# Patient Record
Sex: FEMALE | Race: WHITE | Employment: UNEMPLOYED
[De-identification: names, ages, dates, MRNs, and addresses within clinical notes are randomized per-mention and may not be internally consistent; named-entity substitution may affect disease eponyms.]

---

## 2023-02-02 ENCOUNTER — OFFICE VISIT (OUTPATIENT)
Dept: INTERNAL MEDICINE CLINIC | Facility: CLINIC | Age: 43
End: 2023-02-02
Payer: COMMERCIAL

## 2023-02-02 VITALS
HEIGHT: 64 IN | HEART RATE: 83 BPM | DIASTOLIC BLOOD PRESSURE: 76 MMHG | BODY MASS INDEX: 30.63 KG/M2 | SYSTOLIC BLOOD PRESSURE: 103 MMHG | WEIGHT: 179.4 LBS | OXYGEN SATURATION: 98 % | TEMPERATURE: 98.2 F

## 2023-02-02 DIAGNOSIS — E66.9 OBESITY (BMI 30-39.9): ICD-10-CM

## 2023-02-02 DIAGNOSIS — G43.909 MIGRAINE WITHOUT STATUS MIGRAINOSUS, NOT INTRACTABLE, UNSPECIFIED MIGRAINE TYPE: Primary | ICD-10-CM

## 2023-02-02 DIAGNOSIS — M06.341: ICD-10-CM

## 2023-02-02 DIAGNOSIS — F32.A DEPRESSION, UNSPECIFIED DEPRESSION TYPE: ICD-10-CM

## 2023-02-02 DIAGNOSIS — Z83.79 FAMILY HISTORY OF CIRRHOSIS OF LIVER: ICD-10-CM

## 2023-02-02 DIAGNOSIS — G40.A09: ICD-10-CM

## 2023-02-02 PROBLEM — M06.342: Status: ACTIVE | Noted: 2023-02-02

## 2023-02-02 PROCEDURE — 99204 OFFICE O/P NEW MOD 45 MIN: CPT | Performed by: INTERNAL MEDICINE

## 2023-02-02 RX ORDER — TOPIRAMATE 50 MG/1
TABLET, FILM COATED ORAL
Qty: 210 TABLET | Refills: 1 | Status: SHIPPED | OUTPATIENT
Start: 2023-02-02

## 2023-02-02 RX ORDER — TOPIRAMATE 50 MG/1
50 TABLET, FILM COATED ORAL 2 TIMES DAILY
COMMUNITY
End: 2023-02-02 | Stop reason: SDUPTHER

## 2023-02-02 RX ORDER — RIZATRIPTAN BENZOATE 10 MG/1
10 TABLET ORAL
Qty: 18 TABLET | Refills: 5 | Status: SHIPPED | OUTPATIENT
Start: 2023-02-02 | End: 2023-02-02

## 2023-02-02 RX ORDER — PHENTERMINE HYDROCHLORIDE 37.5 MG/1
37.5 CAPSULE ORAL EVERY MORNING
Qty: 30 CAPSULE | Refills: 1 | Status: SHIPPED | OUTPATIENT
Start: 2023-02-02 | End: 2023-04-03

## 2023-02-02 RX ORDER — RIZATRIPTAN BENZOATE 10 MG/1
10 TABLET ORAL
COMMUNITY
End: 2023-02-02 | Stop reason: SDUPTHER

## 2023-02-02 RX ORDER — FLUOXETINE HYDROCHLORIDE 40 MG/1
40 CAPSULE ORAL DAILY
Qty: 90 CAPSULE | Refills: 1 | Status: SHIPPED | OUTPATIENT
Start: 2023-02-02

## 2023-02-02 RX ORDER — FLUOXETINE HYDROCHLORIDE 20 MG/1
20 CAPSULE ORAL DAILY
COMMUNITY
End: 2023-02-02 | Stop reason: SDUPTHER

## 2023-02-02 ASSESSMENT — PATIENT HEALTH QUESTIONNAIRE - PHQ9
SUM OF ALL RESPONSES TO PHQ QUESTIONS 1-9: 0
SUM OF ALL RESPONSES TO PHQ QUESTIONS 1-9: 0
SUM OF ALL RESPONSES TO PHQ9 QUESTIONS 1 & 2: 0
SUM OF ALL RESPONSES TO PHQ QUESTIONS 1-9: 0
SUM OF ALL RESPONSES TO PHQ QUESTIONS 1-9: 0
2. FEELING DOWN, DEPRESSED OR HOPELESS: 0
1. LITTLE INTEREST OR PLEASURE IN DOING THINGS: 0

## 2023-02-02 NOTE — ACP (ADVANCE CARE PLANNING)
Advance Care Planning   Advance Care Planning (ACP)     Attempted to discuss ACP with Ms. Andino today, she declines to discuss at this time. Will readdress at future visit.

## 2023-02-02 NOTE — ASSESSMENT & PLAN NOTE
Patient continues with very active lifestyle, but with concerns of slowing metabolism. Interested in trial of Adipex at this time.

## 2023-02-02 NOTE — PROGRESS NOTES
Iqra Cheung (: 1980) is a 40 y.o. female, here for evaluation of the following chief complaint(s):  New Patient (Pt is here to estab PCP care. Pt has moved from [facility] with Dr. Gisela Almendarez) and Weight Management (Pt would like to discuss some weight loss options. Pt states she's never lost weight after she gave birth)       ASSESSMENT/PLAN:  1. Migraine without status migrainosus, not intractable, unspecified migraine type  Assessment & Plan:  Patient establishing with prescriptions needing refills for maxalt and topamax. Offering trial of Ubrelvy. Orders:  -     rizatriptan (MAXALT) 10 MG tablet; Take 1 tablet by mouth once as needed for Migraine May repeat in 2 hours if needed, Disp-18 tablet, R-5Normal  -     topiramate (TOPAMAX) 50 MG tablet; 3 tablets by mouth in the morning, take 4 tablets nightly, Disp-210 tablet, R-1Normal  -     FLUoxetine (PROZAC) 40 MG capsule; Take 1 capsule by mouth daily 2 capsules by mouth every day, Disp-90 capsule, R-1Normal  -     [address]  2. Depression, unspecified depression type  Assessment & Plan:  Patient establishing with prescription for Prozac 20 Will refill. Orders:  -     FLUoxetine (PROZAC) 40 MG capsule; Take 1 capsule by mouth daily 2 capsules by mouth every day, Disp-90 capsule, R-1Normal  3. Childhood absence epilepsy, non-refractory (HCC)  Assessment & Plan:   Normal EEG's. Absence seizures as a child. One grand mal. Offering referral to Neurology. 4. Obesity (BMI 30-39. 9)  Assessment & Plan:  Patient continues with very active lifestyle, but with concerns of slowing metabolism. Interested in trial of Adipex at this time. Orders:  -     Hemoglobin A1C; Future  -     phentermine (ADIPEX-P) 37.5 MG capsule; Take 1 capsule by mouth every morning for 60 days. Max Daily Amount: 37.5 mg, Disp-30 capsule, R-1Normal  5. Rheumatoid nodule of right hand [facility])  Assessment & Plan:   Checking ARTUR, RF, and anti-ccp.  Patient mother and grandmother with erosive arthritis. Orders:  -     ARTUR, Direct, w/Reflex; Future  -     Rheumatoid Factor; Future  -     CCP Antibodies, IGG/IGA; Future  6. Family history of cirrhosis of liver  Assessment & Plan:   Father with non-alcoholic liver cirrhosis. Checking CMP. Encouraging weight loss. Orders:  -     Comprehensive Metabolic Panel; Future             SUBJECTIVE/OBJECTIVE:  HPI: Patient is a very pleasant 73-year-old woman with medical history significant for migraine disorder, childhood epilepsy, posttraumatic stress anxiety, rheumatoid nodule on the left PIP, family history of liver cirrhosis secondary to fatty liver the presents to establish care with new PCP. Patient's weight is in the obese category at 179 with a BMI of 30.79. Weight increase since birth of her last child has been challening to get off. Patient has been using Maxalt for migraine control but with limited success. Patient has been using Topamax has seizure prophylaxis given her remote childhood history of seizures. Patient with a rheumatoid nodule removed from her left hand roughly 1 year ago. Patient has concerns of challenging weight despite her 4 times weekly exercise routine. We will check established with labs at this time. We will offer patient ARTUR as well as rheumatoid factor and CCP. Given patient's family history of liver disease, will consider smooth muscle antibody as well following results of ARTUR and metabolic panel. Patient [address] eligible for a routine screening vaccination methods. Offered patient trial of Ubrelvy at this time for uncontrolled migraine. Bilateral knee pain present that improves with use at the time of her right third digit nodule. Previous elevations in lupus labs. Exercising with walking and chasing her 3year-old Ramsey around.    Social History     Tobacco Use    Smoking status: Never    Smokeless tobacco: Never   Vaping Use    Vaping Use: Never used   Substance Use Topics    Drug use: Never     Vitals:    02/02/23 1049   BP: 103/76   Pulse: 83   Temp: 98.2 °F (36.8 °C)   SpO2: 98%   Weight: 179 lb 6.4 oz (81.4 kg)   Height: 5' 4\" (1.626 m)      Body mass index is 30.79 kg/m². Physical Exam  Constitutional:       Appearance: Normal appearance. HENT:      Head: Normocephalic. Eyes:      Extraocular Movements: Extraocular movements intact. Cardiovascular:      Rate and Rhythm: Normal rate and regular rhythm. Skin:     General: Skin is warm and dry. Neurological:      Mental Status: She is alert. An electronic signature was used to authenticate this note.   Srikanth Garcia, DO

## 2023-02-02 NOTE — ASSESSMENT & PLAN NOTE
Patient establishing with prescriptions needing refills for maxalt and topamax. Offering trial of Ubrelvy.

## 2023-02-03 DIAGNOSIS — M06.341: ICD-10-CM

## 2023-02-03 DIAGNOSIS — E66.9 OBESITY (BMI 30-39.9): ICD-10-CM

## 2023-02-03 DIAGNOSIS — R76.8 DS DNA ANTIBODY POSITIVE: ICD-10-CM

## 2023-02-03 DIAGNOSIS — R76.8 ANA POSITIVE: Primary | ICD-10-CM

## 2023-02-03 DIAGNOSIS — Z83.79 FAMILY HISTORY OF CIRRHOSIS OF LIVER: ICD-10-CM

## 2023-02-03 LAB
EST. AVERAGE GLUCOSE BLD GHB EST-MCNC: 111 MG/DL
HBA1C MFR BLD: 5.5 % (ref 4.8–5.6)

## 2023-02-04 LAB
ALBUMIN SERPL-MCNC: 3.7 G/DL (ref 3.5–5)
ALBUMIN/GLOB SERPL: 1 (ref 0.4–1.6)
ALP SERPL-CCNC: 103 U/L (ref 50–136)
ALT SERPL-CCNC: 21 U/L (ref 12–65)
ANION GAP SERPL CALC-SCNC: 7 MMOL/L (ref 2–11)
AST SERPL-CCNC: 8 U/L (ref 15–37)
BILIRUB SERPL-MCNC: 0.4 MG/DL (ref 0.2–1.1)
BUN SERPL-MCNC: 19 MG/DL (ref 6–23)
CALCIUM SERPL-MCNC: 9.1 MG/DL (ref 8.3–10.4)
CHLORIDE SERPL-SCNC: 115 MMOL/L (ref 101–110)
CO2 SERPL-SCNC: 23 MMOL/L (ref 21–32)
CREAT SERPL-MCNC: 1 MG/DL (ref 0.6–1)
GLOBULIN SER CALC-MCNC: 3.6 G/DL (ref 2.8–4.5)
GLUCOSE SERPL-MCNC: 117 MG/DL (ref 65–100)
POTASSIUM SERPL-SCNC: 4.2 MMOL/L (ref 3.5–5.1)
PROT SERPL-MCNC: 7.3 G/DL (ref 6.3–8.2)
SODIUM SERPL-SCNC: 145 MMOL/L (ref 133–143)

## 2023-02-06 LAB
ANA SER QL: POSITIVE
CENTROMERE B AB SER-ACNC: <0.2 AI (ref 0–0.9)
CHROMATIN AB SERPL-ACNC: <0.2 AI (ref 0–0.9)
DSDNA AB SER-ACNC: 20 IU/ML (ref 0–9)
ENA JO1 AB SER-ACNC: <0.2 AI (ref 0–0.9)
ENA RNP AB SER-ACNC: <0.2 AI (ref 0–0.9)
ENA SCL70 AB SER-ACNC: <0.2 AI (ref 0–0.9)
ENA SM AB SER-ACNC: <0.2 AI (ref 0–0.9)
ENA SS-A AB SER-ACNC: <0.2 AI (ref 0–0.9)
ENA SS-B AB SER-ACNC: <0.2 AI (ref 0–0.9)
Lab: ABNORMAL
RHEUMATOID FACT SER QL LA: NEGATIVE

## 2023-02-07 LAB — CCP IGA+IGG SERPL IA-ACNC: 4 UNITS (ref 0–19)

## 2023-02-08 PROBLEM — R76.8 DS DNA ANTIBODY POSITIVE: Status: ACTIVE | Noted: 2023-02-08

## 2023-02-08 PROBLEM — R76.8 ANA POSITIVE: Status: ACTIVE | Noted: 2023-02-08

## 2023-03-13 ENCOUNTER — OFFICE VISIT (OUTPATIENT)
Dept: INTERNAL MEDICINE CLINIC | Facility: CLINIC | Age: 43
End: 2023-03-13
Payer: COMMERCIAL

## 2023-03-13 VITALS
HEART RATE: 111 BPM | WEIGHT: 172.8 LBS | BODY MASS INDEX: 29.5 KG/M2 | DIASTOLIC BLOOD PRESSURE: 80 MMHG | HEIGHT: 64 IN | OXYGEN SATURATION: 100 % | SYSTOLIC BLOOD PRESSURE: 149 MMHG | TEMPERATURE: 98.6 F

## 2023-03-13 DIAGNOSIS — R76.8 ANA POSITIVE: ICD-10-CM

## 2023-03-13 DIAGNOSIS — R76.8 DS DNA ANTIBODY POSITIVE: ICD-10-CM

## 2023-03-13 DIAGNOSIS — R03.0 ELEVATED BLOOD PRESSURE READING IN OFFICE WITHOUT DIAGNOSIS OF HYPERTENSION: ICD-10-CM

## 2023-03-13 DIAGNOSIS — G43.909 MIGRAINE WITHOUT STATUS MIGRAINOSUS, NOT INTRACTABLE, UNSPECIFIED MIGRAINE TYPE: Primary | ICD-10-CM

## 2023-03-13 DIAGNOSIS — E66.9 OBESITY (BMI 30-39.9): ICD-10-CM

## 2023-03-13 PROCEDURE — 99214 OFFICE O/P EST MOD 30 MIN: CPT | Performed by: INTERNAL MEDICINE

## 2023-03-13 SDOH — ECONOMIC STABILITY: INCOME INSECURITY: HOW HARD IS IT FOR YOU TO PAY FOR THE VERY BASICS LIKE FOOD, HOUSING, MEDICAL CARE, AND HEATING?: NOT HARD AT ALL

## 2023-03-13 SDOH — ECONOMIC STABILITY: FOOD INSECURITY: WITHIN THE PAST 12 MONTHS, YOU WORRIED THAT YOUR FOOD WOULD RUN OUT BEFORE YOU GOT MONEY TO BUY MORE.: NEVER TRUE

## 2023-03-13 SDOH — ECONOMIC STABILITY: HOUSING INSECURITY
IN THE LAST 12 MONTHS, WAS THERE A TIME WHEN YOU DID NOT HAVE A STEADY PLACE TO SLEEP OR SLEPT IN A SHELTER (INCLUDING NOW)?: NO

## 2023-03-13 SDOH — ECONOMIC STABILITY: FOOD INSECURITY: WITHIN THE PAST 12 MONTHS, THE FOOD YOU BOUGHT JUST DIDN'T LAST AND YOU DIDN'T HAVE MONEY TO GET MORE.: NEVER TRUE

## 2023-03-13 ASSESSMENT — PATIENT HEALTH QUESTIONNAIRE - PHQ9
4. FEELING TIRED OR HAVING LITTLE ENERGY: 0
2. FEELING DOWN, DEPRESSED OR HOPELESS: 0
3. TROUBLE FALLING OR STAYING ASLEEP: 0
SUM OF ALL RESPONSES TO PHQ9 QUESTIONS 1 & 2: 0
SUM OF ALL RESPONSES TO PHQ QUESTIONS 1-9: 0
9. THOUGHTS THAT YOU WOULD BE BETTER OFF DEAD, OR OF HURTING YOURSELF: 0
SUM OF ALL RESPONSES TO PHQ QUESTIONS 1-9: 0
7. TROUBLE CONCENTRATING ON THINGS, SUCH AS READING THE NEWSPAPER OR WATCHING TELEVISION: 0
SUM OF ALL RESPONSES TO PHQ QUESTIONS 1-9: 0
SUM OF ALL RESPONSES TO PHQ QUESTIONS 1-9: 0
8. MOVING OR SPEAKING SO SLOWLY THAT OTHER PEOPLE COULD HAVE NOTICED. OR THE OPPOSITE, BEING SO FIGETY OR RESTLESS THAT YOU HAVE BEEN MOVING AROUND A LOT MORE THAN USUAL: 0
10. IF YOU CHECKED OFF ANY PROBLEMS, HOW DIFFICULT HAVE THESE PROBLEMS MADE IT FOR YOU TO DO YOUR WORK, TAKE CARE OF THINGS AT HOME, OR GET ALONG WITH OTHER PEOPLE: 0
5. POOR APPETITE OR OVEREATING: 0
1. LITTLE INTEREST OR PLEASURE IN DOING THINGS: 0
6. FEELING BAD ABOUT YOURSELF - OR THAT YOU ARE A FAILURE OR HAVE LET YOURSELF OR YOUR FAMILY DOWN: 0

## 2023-03-13 NOTE — ASSESSMENT & PLAN NOTE
Patient utilizing Adipex for weight loss. Elevated BP to 149/80 in office. Will consider low dose ARB or BB at future visit if continued elevations.

## 2023-03-13 NOTE — ASSESSMENT & PLAN NOTE
Trial of Adipex offered at last visit. Weight at that time 179. Down to 172 today in office. BP slightly elevated today.

## 2023-03-13 NOTE — ACP (ADVANCE CARE PLANNING)
Advance Care Planning   The patient has the following advanced directives on file:  Advance Directives       Power of [address] ACP-Advance Directive ACP-Power of     Not on File Not on File Not on File Not on File            The patient has appointed the following active healthcare agents:    Primary Decision Maker: Aidegil Anita - Spouse - [phone]    The Patient has the following current code status:    Code Status: Not on file    Visit Documentation:  I discussed [address] with Aliajodi Gilma today which included the importance of making their choices for care and treatment in the case of a health event that adversely affects their decision-making abilities. She has not completed the Advance Care Directives. She does not have an active health care agent at this time. Shimon Dillard was encouraged to complete the declaration forms and provide a signed copy of her medical records.      Bertin Ryan  3/13/2023

## 2023-03-13 NOTE — PROGRESS NOTES
Azucena Crockett (: 1980) is a 40 y.o. female, here for evaluation of the following chief complaint(s):  Discuss Labs (Pt is here to discuss lab results.)       ASSESSMENT/PLAN:  1. Migraine without status migrainosus, not intractable, unspecified migraine type  Assessment & Plan:   Using Maxalt and Topamax. Hx of childhood absence seizure. Offering Nolia Closs as a trial.   2. Obesity (BMI 30-39. 9)  Assessment & Plan:   Trial of Adipex offered at last visit. Weight at that time 179. Down to 172 today in office. BP slightly elevated today. 3. Ds DNA antibody positive  Assessment & Plan:  Referred previously to Rheumatology. No scheduled appt currently listed. 4. ARTUR positive  5. Elevated blood pressure reading in office without diagnosis of hypertension  Assessment & Plan:   Patient utilizing Adipex for weight loss. Elevated BP to 149/80 in office. Will consider low dose ARB or BB at future visit if continued elevations. SUBJECTIVE/OBJECTIVE:  HPI: Patient is a very pleasant 27-year-old woman with a medical history significant for migraine disorder, obesity, rheumatoid nodule of the right hand, childhood absence epilepsy, and positive ARTUR, and double-stranded DNA antibody presents for routine follow-up visit. Patient previously referred to rheumatology. Patient with strong rheumatologic family history. Patient's goal for weight loss assisted with use of Adipex though increasing blood pressure. Patient's systolic blood pressure elevated above 140 today in office. We will consider additional treatment options including angiotensin receptor blockade or BB given her elevated HR at future visits if continued elevations present. Patient would like to discontinue Adipex at this time as an adverse effect on her depression currently occurring. Current concerns of right arm parasthesia. Previously referred to neurology. Sensation described as a tingling sensation intermittently.  No alleviated or aggravating factors. No change in quality or consistency of symptoms. Her migraine use of topamax has been onging for 20 years. She has concerns for potential memory impairment. Migraine burden related to ovulatory cycle occurring frequently over the course of a week. Maxalt is not breaking migraine. Previous efforts to consider [employer] as an alternative has not resulted in improved symptoms. Social History     Tobacco Use    Smoking status: Never    Smokeless tobacco: Never   Vaping Use    Vaping Use: Never used   Substance Use Topics    Drug use: Never     Vitals:    03/13/23 1141   BP: (!) 149/80   Pulse: (!) 111   Temp: 98.6 °F (37 °C)   SpO2: 100%   Weight: 172 lb 12.8 oz (78.4 kg)   Height: 5' 4\" (1.626 m)      Body mass index is 29.66 kg/m². Physical Exam  Constitutional:       Appearance: Normal appearance. HENT:      Head: Normocephalic. Eyes:      Extraocular Movements: Extraocular movements intact. Cardiovascular:      Rate and Rhythm: Normal rate and regular rhythm. Skin:     General: Skin is warm and dry. Neurological:      Mental Status: She is alert. [place]-to-face interaction with patient 30 minutes. An electronic signature was used to authenticate this note.   Jill Kendrick DO

## 2023-04-17 ENCOUNTER — OFFICE VISIT (OUTPATIENT)
Dept: INTERNAL MEDICINE CLINIC | Facility: CLINIC | Age: 43
End: 2023-04-17
Payer: COMMERCIAL

## 2023-04-17 VITALS
DIASTOLIC BLOOD PRESSURE: 76 MMHG | BODY MASS INDEX: 29.53 KG/M2 | SYSTOLIC BLOOD PRESSURE: 123 MMHG | OXYGEN SATURATION: 99 % | HEIGHT: 64 IN | WEIGHT: 173 LBS | TEMPERATURE: 98.3 F | HEART RATE: 93 BPM

## 2023-04-17 DIAGNOSIS — R21 RASH AND NONSPECIFIC SKIN ERUPTION: Primary | ICD-10-CM

## 2023-04-17 DIAGNOSIS — L28.2 PRURIGO: ICD-10-CM

## 2023-04-17 PROCEDURE — 99213 OFFICE O/P EST LOW 20 MIN: CPT | Performed by: NURSE PRACTITIONER

## 2023-04-17 RX ORDER — TRIAMCINOLONE ACETONIDE 5 MG/G
CREAM TOPICAL
Qty: 45 G | Refills: 1 | Status: SHIPPED | OUTPATIENT
Start: 2023-04-17

## 2023-04-17 ASSESSMENT — ENCOUNTER SYMPTOMS
EYE PAIN: 0
SORE THROAT: 0
DIARRHEA: 0
ABDOMINAL PAIN: 0
SHORTNESS OF BREATH: 0
SINUS PAIN: 0
COUGH: 0
RHINORRHEA: 0
NAUSEA: 0
BACK PAIN: 0
VOMITING: 0
CONSTIPATION: 0

## 2023-04-17 NOTE — PROGRESS NOTES
[place] [id number] [facility]  Tel# [phone]  Fax# [phone]     Tory Paulino [place], [facility]-BC  Family Nurse Practitioner            Date of Visit: 2023     Casi Jaime (: 1980) is a 40 y.o. female  established patient, here for evaluation of the following chief complaint(s):    Chief Complaint   Patient presents with    Vaginal Itching     For the past 2 mths she has noticed the texture of her skin has changed outside of her vagina (bump/scaly), next to her bottom. She states wiping aggravates it and her skin breaks easily. She states she has itching, but it is outside of her vagina. She does not have a GYN here yet, and cannot get in until  or July. Patient Care Team:  Renetta Lyn DO as PCP - General (Internal Medicine)  Renetta Lyn DO as PCP - Empaneled Provider         History of Present Illness      Itching  Presents here today with itching to her perineal area that started 2 months ago. Pt states she can feel her skin texture has changed and feels irritated when wiping with tissue. Has tried OTC vaseline cream. Denies any vaginal discharge or skin discharge. States she had an abscess to right labia area last year that needed to be excised. Pt denies any hx of STD such as HSV. Patient Active Problem List   Diagnosis    Migraine without status migrainosus, not intractable    Depression    Childhood absence epilepsy, non-refractory (HCC)    Obesity (BMI 30-39. 9)    Rheumatoid nodule of right hand (HCC)    Family history of cirrhosis of liver    Ds DNA antibody positive    ARTUR positive    Elevated blood pressure reading in office without diagnosis of hypertension       Past Medical History:   Diagnosis Date    Headache        History reviewed. No pertinent surgical history. Family History   Problem Relation Age of Onset    Liver Disease Father         end stage liver disease.     No Known Problems Brother          ALLERGY  Allergies

## 2023-08-02 DIAGNOSIS — G43.909 MIGRAINE WITHOUT STATUS MIGRAINOSUS, NOT INTRACTABLE, UNSPECIFIED MIGRAINE TYPE: ICD-10-CM

## 2023-08-02 RX ORDER — TOPIRAMATE 50 MG/1
TABLET, FILM COATED ORAL
Qty: 210 TABLET | Refills: 1 | Status: SHIPPED | OUTPATIENT
Start: 2023-08-02

## 2023-08-02 NOTE — TELEPHONE ENCOUNTER
Refill of Topamax  50 mg requested  - pended.   Patient last seen 3-13-23  Next office visit 9-13-23

## 2023-08-22 ENCOUNTER — OFFICE VISIT (OUTPATIENT)
Dept: INTERNAL MEDICINE CLINIC | Facility: CLINIC | Age: 43
End: 2023-08-22
Payer: COMMERCIAL

## 2023-08-22 VITALS
SYSTOLIC BLOOD PRESSURE: 100 MMHG | HEART RATE: 82 BPM | TEMPERATURE: 98.6 F | HEIGHT: 64 IN | DIASTOLIC BLOOD PRESSURE: 75 MMHG | OXYGEN SATURATION: 98 % | BODY MASS INDEX: 30.46 KG/M2 | WEIGHT: 178.4 LBS

## 2023-08-22 DIAGNOSIS — F32.A DEPRESSION, UNSPECIFIED DEPRESSION TYPE: ICD-10-CM

## 2023-08-22 DIAGNOSIS — R76.8 DS DNA ANTIBODY POSITIVE: ICD-10-CM

## 2023-08-22 DIAGNOSIS — R03.0 ELEVATED BLOOD PRESSURE READING IN OFFICE WITHOUT DIAGNOSIS OF HYPERTENSION: Primary | ICD-10-CM

## 2023-08-22 DIAGNOSIS — M99.02 SOMATIC DYSFUNCTION OF THORACIC REGION: ICD-10-CM

## 2023-08-22 DIAGNOSIS — R76.8 ANA POSITIVE: ICD-10-CM

## 2023-08-22 DIAGNOSIS — R21 RASH AND NONSPECIFIC SKIN ERUPTION: ICD-10-CM

## 2023-08-22 DIAGNOSIS — K12.0 APHTHAE, ORAL: ICD-10-CM

## 2023-08-22 DIAGNOSIS — G43.909 MIGRAINE WITHOUT STATUS MIGRAINOSUS, NOT INTRACTABLE, UNSPECIFIED MIGRAINE TYPE: ICD-10-CM

## 2023-08-22 DIAGNOSIS — G40.A09: ICD-10-CM

## 2023-08-22 DIAGNOSIS — M06.341: ICD-10-CM

## 2023-08-22 PROCEDURE — 98926 OSTEOPATH MANJ 3-4 REGIONS: CPT | Performed by: INTERNAL MEDICINE

## 2023-08-22 PROCEDURE — 99214 OFFICE O/P EST MOD 30 MIN: CPT | Performed by: INTERNAL MEDICINE

## 2023-08-22 RX ORDER — FLUOXETINE HYDROCHLORIDE 40 MG/1
40 CAPSULE ORAL DAILY
Qty: 90 CAPSULE | Refills: 1 | Status: SHIPPED | OUTPATIENT
Start: 2023-08-22

## 2023-08-22 RX ORDER — TOPIRAMATE 50 MG/1
TABLET, FILM COATED ORAL
Qty: 210 TABLET | Refills: 1 | Status: SHIPPED | OUTPATIENT
Start: 2023-08-22

## 2023-08-22 RX ORDER — TRIAMCINOLONE ACETONIDE 5 MG/G
CREAM TOPICAL
Qty: 45 G | Refills: 1 | Status: SHIPPED | OUTPATIENT
Start: 2023-08-22

## 2023-08-22 RX ORDER — RIZATRIPTAN BENZOATE 10 MG/1
10 TABLET ORAL
Qty: 18 TABLET | Refills: 5 | Status: SHIPPED | OUTPATIENT
Start: 2023-08-22 | End: 2023-08-22

## 2023-08-22 RX ORDER — PREDNISONE 20 MG/1
20 TABLET ORAL DAILY
Qty: 5 TABLET | Refills: 0 | Status: SHIPPED | OUTPATIENT
Start: 2023-08-22 | End: 2023-08-27

## 2023-08-22 ASSESSMENT — PATIENT HEALTH QUESTIONNAIRE - PHQ9
SUM OF ALL RESPONSES TO PHQ9 QUESTIONS 1 & 2: 0
7. TROUBLE CONCENTRATING ON THINGS, SUCH AS READING THE NEWSPAPER OR WATCHING TELEVISION: 0
SUM OF ALL RESPONSES TO PHQ QUESTIONS 1-9: 0
3. TROUBLE FALLING OR STAYING ASLEEP: 0
2. FEELING DOWN, DEPRESSED OR HOPELESS: 0
4. FEELING TIRED OR HAVING LITTLE ENERGY: 0
9. THOUGHTS THAT YOU WOULD BE BETTER OFF DEAD, OR OF HURTING YOURSELF: 0
SUM OF ALL RESPONSES TO PHQ QUESTIONS 1-9: 0
6. FEELING BAD ABOUT YOURSELF - OR THAT YOU ARE A FAILURE OR HAVE LET YOURSELF OR YOUR FAMILY DOWN: 0
1. LITTLE INTEREST OR PLEASURE IN DOING THINGS: 0
5. POOR APPETITE OR OVEREATING: 0
8. MOVING OR SPEAKING SO SLOWLY THAT OTHER PEOPLE COULD HAVE NOTICED. OR THE OPPOSITE, BEING SO FIGETY OR RESTLESS THAT YOU HAVE BEEN MOVING AROUND A LOT MORE THAN USUAL: 0
10. IF YOU CHECKED OFF ANY PROBLEMS, HOW DIFFICULT HAVE THESE PROBLEMS MADE IT FOR YOU TO DO YOUR WORK, TAKE CARE OF THINGS AT HOME, OR GET ALONG WITH OTHER PEOPLE: 0

## 2023-08-22 NOTE — ASSESSMENT & PLAN NOTE
Referred to Rheum previously. Patient missed visit as her father was undergoing liver transplant at the time. Continued right rheumatoid nodule of right third digit. New oral aphthous ulcers. Patient with new point specific puritis and tenderness at the perineum. 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 Offering steroid burst and referral back to Rheum. Discussed potential plaquenil in the future.

## 2023-08-22 NOTE — ACP (ADVANCE CARE PLANNING)
Advance Care Planning   The patient has the following advanced directives on file:  Advance Directives       Power of [address] Will ACP-Advance Directive ACP-Power of     Not on File Not on File Not on File Not on File            The patient has appointed the following active healthcare agents:    Primary Decision Maker: Sri Payne - Spouse - [phone]    The Patient has the following current code status:    Code Status: Not on file    Visit Documentation:  I discussed [address] [place] [address] with aRchel Wilson today which included the importance of making their choices for care and treatment in the case of a health event that adversely affects their decision-making abilities. She has not completed the Advance Care Directives. She does not have an active health care agent at this time. Rachel Wilson was encouraged to complete the declaration forms and provide a signed copy of her medical records.          Bertin Hall  8/22/2023
202.8

## 2023-08-22 NOTE — ASSESSMENT & PLAN NOTE
Tolerant of Maxalt and Topamax prophylactic and with hx of absance seizure  Long term use of Topamax concerning to patient as her memory has been spotty lately.

## 2023-08-22 NOTE — PROGRESS NOTES
Micki Orr (: 1980) is a 40 y.o. female, here for evaluation of the following chief complaint(s):  6 Month Follow-Up (Pt is here for routine check up.)       ASSESSMENT/PLAN:  1. Elevated blood pressure reading in office without diagnosis of hypertension  Assessment & Plan:   Well controlled BP in office today. 2. Migraine without status migrainosus, not intractable, unspecified migraine type  Assessment & Plan: Tolerant of Maxalt and Topamax prophylactic and with hx of absance seizure  Long term use of Topamax concerning to patient as her memory has been spotty lately. Orders:  -     topiramate (TOPAMAX) 50 MG tablet; 3 tablets by mouth in the morning, take 4 tablets nightly, Disp-210 tablet, R-1Normal  -     rizatriptan (MAXALT) 10 MG tablet; Take 1 tablet by mouth once as needed for Migraine May repeat in 2 hours if needed, Disp-18 tablet, R-5Normal  -     FLUoxetine (PROZAC) 40 MG capsule; Take 1 capsule by mouth daily 2 capsules by mouth every day, Disp-90 capsule, R-1Normal  -     [facility]  3. Rash and nonspecific skin eruption  -     triamcinolone (ARISTOCORT) 0.5 % cream; Apply topically 2 times daily. , Disp-45 g, R-1, Normal  4. Depression, unspecified depression type  -     FLUoxetine (PROZAC) 40 MG capsule; Take 1 capsule by mouth daily 2 capsules by mouth every day, Disp-90 capsule, R-1Normal  5. Ds DNA antibody positive  Assessment & Plan:  Referred to Rheum previously. Patient missed visit as her father was undergoing liver transplant at the time. Continued right rheumatoid nodule of right third digit. New oral aphthous ulcers. Patient with new point specific puritis and tenderness at the perineum. 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 Offering steroid burst and referral back to Rheum. Discussed potential plaquenil in the future. Orders:  -     predniSONE (DELTASONE) 20 MG tablet;  Take 1 tablet by mouth daily for 5 days, Disp-5

## 2023-08-30 ENCOUNTER — TELEPHONE (OUTPATIENT)
Dept: INTERNAL MEDICINE CLINIC | Facility: CLINIC | Age: 43
End: 2023-08-30

## 2023-08-30 DIAGNOSIS — G43.909 MIGRAINE WITHOUT STATUS MIGRAINOSUS, NOT INTRACTABLE, UNSPECIFIED MIGRAINE TYPE: ICD-10-CM

## 2023-08-30 DIAGNOSIS — F32.A DEPRESSION, UNSPECIFIED DEPRESSION TYPE: ICD-10-CM

## 2023-08-30 RX ORDER — FLUOXETINE HYDROCHLORIDE 40 MG/1
CAPSULE ORAL
Qty: 180 CAPSULE | Refills: 1 | Status: SHIPPED | OUTPATIENT
Start: 2023-08-30

## 2023-09-20 ENCOUNTER — PATIENT MESSAGE (OUTPATIENT)
Dept: INTERNAL MEDICINE CLINIC | Facility: CLINIC | Age: 43
End: 2023-09-20

## 2023-09-20 DIAGNOSIS — G43.909 MIGRAINE WITHOUT STATUS MIGRAINOSUS, NOT INTRACTABLE, UNSPECIFIED MIGRAINE TYPE: Primary | ICD-10-CM

## 2023-09-20 RX ORDER — PREDNISONE 20 MG/1
20 TABLET ORAL DAILY
Qty: 7 TABLET | Refills: 0 | Status: SHIPPED | OUTPATIENT
Start: 2023-09-20 | End: 2023-09-27

## 2023-10-05 ENCOUNTER — OFFICE VISIT (OUTPATIENT)
Dept: INTERNAL MEDICINE CLINIC | Facility: CLINIC | Age: 43
End: 2023-10-05
Payer: COMMERCIAL

## 2023-10-05 VITALS
HEART RATE: 53 BPM | BODY MASS INDEX: 30.39 KG/M2 | HEIGHT: 64 IN | WEIGHT: 178 LBS | OXYGEN SATURATION: 99 % | TEMPERATURE: 98.3 F | SYSTOLIC BLOOD PRESSURE: 104 MMHG | DIASTOLIC BLOOD PRESSURE: 65 MMHG

## 2023-10-05 DIAGNOSIS — R76.8 DS DNA ANTIBODY POSITIVE: Primary | ICD-10-CM

## 2023-10-05 DIAGNOSIS — M25.511 RIGHT SHOULDER PAIN, UNSPECIFIED CHRONICITY: ICD-10-CM

## 2023-10-05 DIAGNOSIS — R76.8 ANA POSITIVE: ICD-10-CM

## 2023-10-05 LAB
ERYTHROCYTE [DISTWIDTH] IN BLOOD BY AUTOMATED COUNT: 13.2 % (ref 11.9–14.6)
ERYTHROCYTE [SEDIMENTATION RATE] IN BLOOD: 3 MM/HR (ref 0–20)
HCT VFR BLD AUTO: 43.3 % (ref 35.8–46.3)
HGB BLD-MCNC: 13.9 G/DL (ref 11.7–15.4)
MCH RBC QN AUTO: 28.7 PG (ref 26.1–32.9)
MCHC RBC AUTO-ENTMCNC: 32.1 G/DL (ref 31.4–35)
MCV RBC AUTO: 89.5 FL (ref 82–102)
NRBC # BLD: 0 K/UL (ref 0–0.2)
PLATELET # BLD AUTO: 329 K/UL (ref 150–450)
PMV BLD AUTO: 10.1 FL (ref 9.4–12.3)
RBC # BLD AUTO: 4.84 M/UL (ref 4.05–5.2)
WBC # BLD AUTO: 8.1 K/UL (ref 4.3–11.1)

## 2023-10-05 PROCEDURE — 99214 OFFICE O/P EST MOD 30 MIN: CPT | Performed by: INTERNAL MEDICINE

## 2023-10-05 RX ORDER — GABAPENTIN 100 MG/1
100 CAPSULE ORAL 3 TIMES DAILY
Qty: 270 CAPSULE | Refills: 1 | Status: SHIPPED | OUTPATIENT
Start: 2023-10-05 | End: 2023-10-05

## 2023-10-05 RX ORDER — PREGABALIN 50 MG/1
50 CAPSULE ORAL 3 TIMES DAILY
Qty: 90 CAPSULE | Refills: 1 | Status: SHIPPED | OUTPATIENT
Start: 2023-10-05 | End: 2023-12-04

## 2023-10-05 ASSESSMENT — PATIENT HEALTH QUESTIONNAIRE - PHQ9
1. LITTLE INTEREST OR PLEASURE IN DOING THINGS: 0
7. TROUBLE CONCENTRATING ON THINGS, SUCH AS READING THE NEWSPAPER OR WATCHING TELEVISION: 0
6. FEELING BAD ABOUT YOURSELF - OR THAT YOU ARE A FAILURE OR HAVE LET YOURSELF OR YOUR FAMILY DOWN: 0
SUM OF ALL RESPONSES TO PHQ QUESTIONS 1-9: 0
3. TROUBLE FALLING OR STAYING ASLEEP: 0
10. IF YOU CHECKED OFF ANY PROBLEMS, HOW DIFFICULT HAVE THESE PROBLEMS MADE IT FOR YOU TO DO YOUR WORK, TAKE CARE OF THINGS AT HOME, OR GET ALONG WITH OTHER PEOPLE: 0
SUM OF ALL RESPONSES TO PHQ QUESTIONS 1-9: 0
2. FEELING DOWN, DEPRESSED OR HOPELESS: 0
4. FEELING TIRED OR HAVING LITTLE ENERGY: 0
SUM OF ALL RESPONSES TO PHQ9 QUESTIONS 1 & 2: 0
SUM OF ALL RESPONSES TO PHQ QUESTIONS 1-9: 0
5. POOR APPETITE OR OVEREATING: 0
8. MOVING OR SPEAKING SO SLOWLY THAT OTHER PEOPLE COULD HAVE NOTICED. OR THE OPPOSITE, BEING SO FIGETY OR RESTLESS THAT YOU HAVE BEEN MOVING AROUND A LOT MORE THAN USUAL: 0
SUM OF ALL RESPONSES TO PHQ QUESTIONS 1-9: 0
9. THOUGHTS THAT YOU WOULD BE BETTER OFF DEAD, OR OF HURTING YOURSELF: 0

## 2023-10-05 NOTE — PROGRESS NOTES
Deidre Ibarra (: 1980) is a 44 y.o. female, here for evaluation of the following chief complaint(s):  Chronic Pain (Having pain in her body worsening )       ASSESSMENT/PLAN:  1. Ds DNA antibody positive  Assessment & Plan:     Orders:  -     Anti- Artur Rebel; Future  -     Anti-Histone and Anti-DS DNA AB; Future  -     Sedimentation Rate; Future  -     C-Reactive Protein; Future  -     C3 Complement; Future  -     C4 Complement; Future  -     gabapentin (NEURONTIN) 100 MG capsule; Take 1 capsule by mouth 3 times daily for 180 days. Intended supply: 90 days, Disp-270 capsule, R-1Normal  -     Basic Metabolic Panel; Future  2. ARTUR positive  Assessment & Plan:   Patient with recent flare of diffuse pain and aphthous ulcerations lasting 12 days. Plaquenil 200 prescribed patient tolerating without significant issue though with some GI upset. Referred previously to rheumatology. Orders:  -     Anti- Artur Rebel; Future  -     Anti-Histone and Anti-DS DNA AB; Future  -     Sedimentation Rate; Future  -     C-Reactive Protein; Future  -     C3 Complement; Future  -     C4 Complement; Future  -     gabapentin (NEURONTIN) 100 MG capsule; Take 1 capsule by mouth 3 times daily for 180 days. Intended supply: 90 days, Disp-270 capsule, R-1Normal  -     Basic Metabolic Panel; Future  -     CBC; Future             SUBJECTIVE/OBJECTIVE:  HPI:Patient with a complex medical history presenting for follow up at office request given her recent concerns of pain. Patient with work up suggestive of rheumatologic disease. Referred previously to Rheumatology. Patient initiated on Plaquenil 200 through this office. Will continue. Checking lupus labs today to include anti-dsDNA, anti-smith, C3, C4, crp, esr, bmp.    Social History     Tobacco Use    Smoking status: Never    Smokeless tobacco: Never   Vaping Use    Vaping Use: Never used   Substance Use Topics    Alcohol use: Never    Drug use: Never     Vitals:    10/05/23 1530   BP: 104/65

## 2023-10-05 NOTE — ASSESSMENT & PLAN NOTE
Patient with recent flare of diffuse pain and aphthous ulcerations lasting 12 days. Plaquenil 200 prescribed patient tolerating without significant issue though with some GI upset. Referred previously to rheumatology.

## 2023-10-06 LAB
ANION GAP SERPL CALC-SCNC: 4 MMOL/L (ref 2–11)
BUN SERPL-MCNC: 18 MG/DL (ref 6–23)
CALCIUM SERPL-MCNC: 9.1 MG/DL (ref 8.3–10.4)
CHLORIDE SERPL-SCNC: 113 MMOL/L (ref 101–110)
CO2 SERPL-SCNC: 25 MMOL/L (ref 21–32)
CREAT SERPL-MCNC: 1.2 MG/DL (ref 0.6–1)
CRP SERPL-MCNC: 0.8 MG/DL (ref 0–0.9)
GLUCOSE SERPL-MCNC: 98 MG/DL (ref 65–100)
POTASSIUM SERPL-SCNC: 4 MMOL/L (ref 3.5–5.1)
SODIUM SERPL-SCNC: 142 MMOL/L (ref 133–143)

## 2023-10-07 LAB
C3 SERPL-MCNC: 144 MG/DL (ref 82–167)
C4 SERPL-MCNC: 36 MG/DL (ref 12–38)
DSDNA AB SER-ACNC: 21 IU/ML (ref 0–9)
ENA SM AB SER-ACNC: <0.2 AI (ref 0–0.9)

## 2023-10-09 ENCOUNTER — TELEPHONE (OUTPATIENT)
Dept: INTERNAL MEDICINE CLINIC | Facility: CLINIC | Age: 43
End: 2023-10-09

## 2023-10-09 NOTE — TELEPHONE ENCOUNTER
----- Message from Rody Ibarra DO sent at 10/9/2023  2:12 PM EDT -----  Recent labs are available. Basic metabolic panel with slight dip in renal function. Anti-Espinoza antibodies within acceptable range, C3 and C4 proteins within acceptable range, CRP, sed rate both within normal range, double-stranded DNA antibodies redemonstrated slightly higher than previously.

## 2023-10-10 ENCOUNTER — TELEPHONE (OUTPATIENT)
Dept: INTERNAL MEDICINE CLINIC | Facility: CLINIC | Age: 43
End: 2023-10-10

## 2023-10-10 LAB
DSDNA AB SER-ACNC: 21 IU/ML (ref 0–9)
HISTONE IGG SER IA-ACNC: 0.4 UNITS (ref 0–0.9)

## 2023-10-10 NOTE — TELEPHONE ENCOUNTER
----- Message from Sai Ireland DO sent at 10/10/2023  4:08 PM EDT -----  Recent labs are available. Double-stranded DNA antibodies are present again. Previously demonstrated 8 months ago. Continued elevation. Previous results of been relayed.

## 2023-10-31 ENCOUNTER — OFFICE VISIT (OUTPATIENT)
Dept: NEUROLOGY | Age: 43
End: 2023-10-31
Payer: COMMERCIAL

## 2023-10-31 VITALS
HEART RATE: 78 BPM | DIASTOLIC BLOOD PRESSURE: 80 MMHG | SYSTOLIC BLOOD PRESSURE: 114 MMHG | BODY MASS INDEX: 30.56 KG/M2 | WEIGHT: 179 LBS | OXYGEN SATURATION: 96 % | HEIGHT: 64 IN

## 2023-10-31 DIAGNOSIS — Z79.899 ENCOUNTER FOR MEDICATION MANAGEMENT: ICD-10-CM

## 2023-10-31 DIAGNOSIS — M32.19 SYSTEMIC LUPUS ERYTHEMATOSUS WITH OTHER ORGAN INVOLVEMENT, UNSPECIFIED SLE TYPE (HCC): ICD-10-CM

## 2023-10-31 DIAGNOSIS — G40.A09: ICD-10-CM

## 2023-10-31 DIAGNOSIS — G43.709 CHRONIC MIGRAINE WITHOUT AURA WITHOUT STATUS MIGRAINOSUS, NOT INTRACTABLE: Primary | ICD-10-CM

## 2023-10-31 PROBLEM — M32.9 SYSTEMIC LUPUS ERYTHEMATOSUS (HCC): Status: ACTIVE | Noted: 2023-10-31

## 2023-10-31 PROCEDURE — 99205 OFFICE O/P NEW HI 60 MIN: CPT | Performed by: PSYCHIATRY & NEUROLOGY

## 2023-10-31 RX ORDER — ATOGEPANT 60 MG/1
60 TABLET ORAL DAILY
Qty: 30 TABLET | Refills: 2 | Status: SHIPPED | OUTPATIENT
Start: 2023-10-31 | End: 2024-01-29

## 2023-10-31 RX ORDER — FLUOXETINE HYDROCHLORIDE 40 MG/1
CAPSULE ORAL
COMMUNITY

## 2023-10-31 ASSESSMENT — ENCOUNTER SYMPTOMS
ABDOMINAL PAIN: 0
EYE PAIN: 0
SORE THROAT: 0
SHORTNESS OF BREATH: 0
NAUSEA: 0
COUGH: 0
TROUBLE SWALLOWING: 0
DIARRHEA: 0

## 2023-10-31 ASSESSMENT — PATIENT HEALTH QUESTIONNAIRE - PHQ9
SUM OF ALL RESPONSES TO PHQ9 QUESTIONS 1 & 2: 0
1. LITTLE INTEREST OR PLEASURE IN DOING THINGS: 0
SUM OF ALL RESPONSES TO PHQ QUESTIONS 1-9: 0
SUM OF ALL RESPONSES TO PHQ QUESTIONS 1-9: 0
2. FEELING DOWN, DEPRESSED OR HOPELESS: 0
SUM OF ALL RESPONSES TO PHQ QUESTIONS 1-9: 0
SUM OF ALL RESPONSES TO PHQ QUESTIONS 1-9: 0

## 2023-10-31 NOTE — PROGRESS NOTES
[facility] NEUROLOGY NOTE    Patient: Tyler Oliveira  Physician: Jamison Rosa MD    CC:   Chief Complaint   Patient presents with    New Patient     Migraines and Childhood absence epilepsy     PCP: Brittney Sousa DO   Referring Provider: Brittney Sousa DO     History of Present Illness:     Tyler Oliveira is a 44 y.o. female who presents for evaluation of migraines and management of epilepsy. Patient reports having frequent chronic migraines occurring with about 16 headache days a month, often lasting up to 2 days in duration. Location begins in occipital region and sometimes becomes unilateral.  There is associated nausea and photophobia when headaches reach 8-10 out of 10 in severity. She has migraine triggers from irritating chemical smells such as bleach. When she tries to use rizatriptan, headache severity decreases from 10 out of 10 to 8 out of 10 and does not improve with the second dose. She has tried to max out the dose to 20 mg per 24 hours without relief. Clinically, this is considered failure to treatment with rizatriptan. Topamax was not improved migraine frequency. Continues to have migraines despite being on Lyrica as well. Saw benefit with Nurtec use. Her epilepsy has been better controlled over the last few years while on topiramate and recently she started Lyrica 50 mg 3 times daily for painful symptoms of lupus flare. Reported pain in her right shoulder and arm region. She can tell when she has a seizure and even absence spells are very recognizable to her, feels as if her brain \"reset\" and usually has some postictal confusion. This has not happened to her for years. Has not had any bilateral tonic-clonic seizures also for many years.       Event Type 1: FIAS / staring spells  Age of onset: early childhood  Aura: none  Ictus: nonresponsive and staring forward, no abnormal movements  Duration: 5-20 seconds  Post-ictus: post-ictal mild confusion for <1-2 mins  Frequency: well

## 2023-11-01 ENCOUNTER — TELEPHONE (OUTPATIENT)
Dept: NEUROLOGY | Age: 43
End: 2023-11-01

## 2023-12-06 ENCOUNTER — OFFICE VISIT (OUTPATIENT)
Dept: NEUROLOGY | Age: 43
End: 2023-12-06
Payer: COMMERCIAL

## 2023-12-06 VITALS
HEIGHT: 64 IN | HEART RATE: 77 BPM | SYSTOLIC BLOOD PRESSURE: 110 MMHG | WEIGHT: 181.4 LBS | DIASTOLIC BLOOD PRESSURE: 75 MMHG | BODY MASS INDEX: 30.97 KG/M2 | OXYGEN SATURATION: 97 %

## 2023-12-06 DIAGNOSIS — R76.8 DS DNA ANTIBODY POSITIVE: ICD-10-CM

## 2023-12-06 DIAGNOSIS — Z79.899 ENCOUNTER FOR MEDICATION MANAGEMENT: ICD-10-CM

## 2023-12-06 DIAGNOSIS — G43.709 CHRONIC MIGRAINE WITHOUT AURA WITHOUT STATUS MIGRAINOSUS, NOT INTRACTABLE: ICD-10-CM

## 2023-12-06 DIAGNOSIS — G40.A09: Primary | ICD-10-CM

## 2023-12-06 PROCEDURE — 99215 OFFICE O/P EST HI 40 MIN: CPT | Performed by: PSYCHIATRY & NEUROLOGY

## 2023-12-06 RX ORDER — LAMOTRIGINE 100 MG/1
100 TABLET ORAL DAILY
Qty: 90 TABLET | Refills: 1 | Status: SHIPPED | OUTPATIENT
Start: 2023-12-06 | End: 2024-06-03

## 2023-12-06 RX ORDER — LAMOTRIGINE 25 MG/1
TABLET ORAL
Qty: 182 TABLET | Refills: 0 | Status: SHIPPED | OUTPATIENT
Start: 2023-12-06 | End: 2024-01-31

## 2023-12-06 RX ORDER — ATOGEPANT 60 MG/1
60 TABLET ORAL DAILY
Qty: 30 TABLET | Refills: 5 | Status: SHIPPED | OUTPATIENT
Start: 2023-12-06 | End: 2024-03-05

## 2023-12-06 ASSESSMENT — ENCOUNTER SYMPTOMS
TROUBLE SWALLOWING: 0
DIARRHEA: 0
NAUSEA: 0
SORE THROAT: 0
COUGH: 0
ABDOMINAL PAIN: 0
EYE PAIN: 0
SHORTNESS OF BREATH: 0

## 2023-12-06 NOTE — PROGRESS NOTES
[facility] NEUROLOGY FOLLOW-UP NOTE    Patient: Reena Chen  Physician: Lefty Bell MD    CC:   Chief Complaint   Patient presents with    Follow-up       PCP: Caron Márquez DO  Referring Provider: Lefty Bell MD    History of Present Illness:     Interval History on 12/6/2023:  Reena Chen is a 44 y.o. female who presents for follow-up management of epilepsy and migraines. Migraines are better managed and less frequent now on prophylactic Qulipta 60mg daily. Frequency is about 1-4 a month now, suspects they occur in catamenial pattern. Responsive to Nurtec for acute episodes, given some samples today. We can try to re-approve this medication, it was too costly at first.    She reports no noticeable FIAS or absence-like seizure episodes except for times when they were provoked by infection or sleep deprivation. She was thought to have a childhood absence epilepsy, which would make Lyrica contraindicated as it can potentially worsen seizure frequency, explained this to her today. Patient also continues to experience ongoing difficulties with memory and would like to transition off of Topamax. We had a discussion about switching her to Lamotrigine and tapering her off of Topamax and Lyrica. Since Lamotrigine can help with mood as a bonus, she may eventually discuss a slow taper off Prozac with PCP when it becomes appropriate. Original HPI:  Reena Chen is a 44 y.o. female who presents for evaluation of migraines and management of epilepsy. Patient reports having frequent chronic migraines occurring with about 16 headache days a month, often lasting up to 2 days in duration. Location begins in occipital region and sometimes becomes unilateral.  There is associated nausea and photophobia when headaches reach 8-10 out of 10 in severity. She has migraine triggers from irritating chemical smells such as bleach.   When she tries to use rizatriptan, headache severity decreases from 10 out

## 2023-12-06 NOTE — PATIENT INSTRUCTIONS
Start with Lamictal 25mg nightly and slowly increase every 2 weeks (per instructions). Weeks 1 and 2: start with taking 25 mg once daily. Weeks 3 and 4: increase to 25 mg twice a day. Week 5 and 6: increase to 50 mg twice a day. Week 7 and 8: increase to 75 mg twice a day. Week 9 and up: increase to 100 mg twice a day. Right away, decrease topamax to 200mg at night for 1 month, then take topamax 100mg at night for 1 week and then stop Topamax. (Continue Lamictal)    Lyrica - only take for neuropathic pain/Lupus flares as needed. Decrease Lyrica by 50mg every day until you take none.

## 2023-12-19 PROBLEM — G89.29 CHRONIC RIGHT SHOULDER PAIN: Status: ACTIVE | Noted: 2023-12-19

## 2023-12-19 PROBLEM — R22.31 NODULE OF FINGER OF RIGHT HAND: Status: ACTIVE | Noted: 2023-12-19

## 2023-12-19 PROBLEM — M35.9 UNDIFFERENTIATED CONNECTIVE TISSUE DISEASE (HCC): Status: ACTIVE | Noted: 2023-12-19

## 2023-12-19 PROBLEM — M32.9 SYSTEMIC LUPUS ERYTHEMATOSUS (HCC): Status: RESOLVED | Noted: 2023-10-31 | Resolved: 2023-12-19

## 2023-12-19 PROBLEM — M06.341: Status: RESOLVED | Noted: 2023-02-02 | Resolved: 2023-12-19

## 2023-12-19 PROBLEM — K12.1 ULCERATION, ORAL MUCOSA: Status: ACTIVE | Noted: 2023-12-19

## 2023-12-19 PROBLEM — M25.511 CHRONIC RIGHT SHOULDER PAIN: Status: ACTIVE | Noted: 2023-12-19

## 2023-12-22 DIAGNOSIS — R76.8 ANA POSITIVE: ICD-10-CM

## 2023-12-22 DIAGNOSIS — R76.8 DS DNA ANTIBODY POSITIVE: ICD-10-CM

## 2023-12-22 LAB
ALBUMIN SERPL-MCNC: 3.7 G/DL (ref 3.5–5)
ALBUMIN/GLOB SERPL: 0.9 (ref 0.4–1.6)
ALP SERPL-CCNC: 100 U/L (ref 50–136)
ALT SERPL-CCNC: 31 U/L (ref 12–65)
ANION GAP SERPL CALC-SCNC: 7 MMOL/L (ref 2–11)
APPEARANCE UR: CLEAR
AST SERPL-CCNC: 11 U/L (ref 15–37)
BACTERIA URNS QL MICRO: NEGATIVE /HPF
BASOPHILS # BLD: 0 K/UL (ref 0–0.2)
BASOPHILS NFR BLD: 0 % (ref 0–2)
BILIRUB SERPL-MCNC: 0.3 MG/DL (ref 0.2–1.1)
BILIRUB UR QL: NEGATIVE
BUN SERPL-MCNC: 18 MG/DL (ref 6–23)
CALCIUM SERPL-MCNC: 9.1 MG/DL (ref 8.3–10.4)
CASTS URNS QL MICRO: ABNORMAL /LPF (ref 0–2)
CHLORIDE SERPL-SCNC: 111 MMOL/L (ref 103–113)
CO2 SERPL-SCNC: 24 MMOL/L (ref 21–32)
COLOR UR: ABNORMAL
CREAT SERPL-MCNC: 1.4 MG/DL (ref 0.6–1)
CREAT UR-MCNC: 154 MG/DL
CRP SERPL-MCNC: 0.6 MG/DL (ref 0–0.9)
DIFFERENTIAL METHOD BLD: NORMAL
EOSINOPHIL # BLD: 0.2 K/UL (ref 0–0.8)
EOSINOPHIL NFR BLD: 4 % (ref 0.5–7.8)
EPI CELLS #/AREA URNS HPF: ABNORMAL /HPF (ref 0–5)
ERYTHROCYTE [DISTWIDTH] IN BLOOD BY AUTOMATED COUNT: 13.2 % (ref 11.9–14.6)
ERYTHROCYTE [SEDIMENTATION RATE] IN BLOOD: 3 MM/HR (ref 0–20)
GLOBULIN SER CALC-MCNC: 4.1 G/DL (ref 2.8–4.5)
GLUCOSE SERPL-MCNC: 115 MG/DL (ref 65–100)
GLUCOSE UR STRIP.AUTO-MCNC: NEGATIVE MG/DL
HCT VFR BLD AUTO: 45.4 % (ref 35.8–46.3)
HGB BLD-MCNC: 14.3 G/DL (ref 11.7–15.4)
HGB UR QL STRIP: NEGATIVE
IMM GRANULOCYTES # BLD AUTO: 0 K/UL (ref 0–0.5)
IMM GRANULOCYTES NFR BLD AUTO: 0 % (ref 0–5)
KETONES UR QL STRIP.AUTO: NEGATIVE MG/DL
LEUKOCYTE ESTERASE UR QL STRIP.AUTO: ABNORMAL
LYMPHOCYTES # BLD: 1.6 K/UL (ref 0.5–4.6)
LYMPHOCYTES NFR BLD: 27 % (ref 13–44)
MCH RBC QN AUTO: 28.3 PG (ref 26.1–32.9)
MCHC RBC AUTO-ENTMCNC: 31.5 G/DL (ref 31.4–35)
MCV RBC AUTO: 89.9 FL (ref 82–102)
MONOCYTES # BLD: 0.4 K/UL (ref 0.1–1.3)
MONOCYTES NFR BLD: 7 % (ref 4–12)
NEUTS SEG # BLD: 3.6 K/UL (ref 1.7–8.2)
NEUTS SEG NFR BLD: 62 % (ref 43–78)
NITRITE UR QL STRIP.AUTO: NEGATIVE
NRBC # BLD: 0 K/UL (ref 0–0.2)
PH UR STRIP: 6.5 (ref 5–9)
PLATELET # BLD AUTO: 343 K/UL (ref 150–450)
PMV BLD AUTO: 9.7 FL (ref 9.4–12.3)
POTASSIUM SERPL-SCNC: 4 MMOL/L (ref 3.5–5.1)
PROT SERPL-MCNC: 7.8 G/DL (ref 6.3–8.2)
PROT UR STRIP-MCNC: NEGATIVE MG/DL
PROT UR-MCNC: 13 MG/DL
PROT/CREAT UR-RTO: 0.1
RBC # BLD AUTO: 5.05 M/UL (ref 4.05–5.2)
RBC #/AREA URNS HPF: ABNORMAL /HPF (ref 0–5)
SODIUM SERPL-SCNC: 142 MMOL/L (ref 136–146)
SP GR UR REFRACTOMETRY: 1.02 (ref 1–1.02)
UROBILINOGEN UR QL STRIP.AUTO: 0.2 EU/DL (ref 0.2–1)
WBC # BLD AUTO: 5.8 K/UL (ref 4.3–11.1)
WBC URNS QL MICRO: ABNORMAL /HPF (ref 0–4)

## 2023-12-24 LAB
C3 SERPL-MCNC: 146 MG/DL (ref 82–167)
C4 SERPL-MCNC: 37 MG/DL (ref 12–38)

## 2023-12-29 LAB — DSDNA CRITHIDIA LUCILIAE: NEGATIVE

## 2023-12-31 LAB
ANA BY IFA: POSITIVE
Lab: ABNORMAL
SPECKLED PATTERN: ABNORMAL

## 2024-01-02 LAB
APTT HEX PL PPP: 6 SEC
APTT IMM NP PPP: NORMAL SEC
APTT PPP 1:1 SALINE: NORMAL SEC
APTT PPP: 25.6 SEC
B2 GLYCOPROT1 IGA SER-ACNC: <10 SAU
B2 GLYCOPROT1 IGG SER-ACNC: <10 SGU
B2 GLYCOPROT1 IGM SER-ACNC: <10 SMU
CARDIOLIPIN IGG SER IA-ACNC: <10 GPL
CARDIOLIPIN IGM SER IA-ACNC: <10 MPL
CONFIRM APTT: 0.8 SEC
CONFIRM DRVVT: NORMAL SEC
INR PPP: 1 RATIO
LABORATORY COMMENT REPORT: NORMAL
PROTHROMBIN TIME: 10.4 SEC
SCREEN DRVVT/NORMAL: NORMAL RATIO
SCREEN DRVVT: 31.4 SEC
THROMBIN TIME: 18.1 SEC

## 2024-01-10 ENCOUNTER — OFFICE VISIT (OUTPATIENT)
Dept: INTERNAL MEDICINE CLINIC | Facility: CLINIC | Age: 44
End: 2024-01-10
Payer: COMMERCIAL

## 2024-01-10 VITALS
DIASTOLIC BLOOD PRESSURE: 78 MMHG | BODY MASS INDEX: 31.24 KG/M2 | WEIGHT: 183 LBS | HEART RATE: 83 BPM | TEMPERATURE: 98.3 F | HEIGHT: 64 IN | OXYGEN SATURATION: 98 % | SYSTOLIC BLOOD PRESSURE: 108 MMHG

## 2024-01-10 DIAGNOSIS — N17.9 ACUTE KIDNEY INJURY (HCC): ICD-10-CM

## 2024-01-10 DIAGNOSIS — M35.1 MIXED CONNECTIVE TISSUE DISEASE (HCC): Primary | ICD-10-CM

## 2024-01-10 DIAGNOSIS — M35.1 MIXED CONNECTIVE TISSUE DISEASE (HCC): ICD-10-CM

## 2024-01-10 DIAGNOSIS — G43.909 MIGRAINE WITHOUT STATUS MIGRAINOSUS, NOT INTRACTABLE, UNSPECIFIED MIGRAINE TYPE: ICD-10-CM

## 2024-01-10 DIAGNOSIS — F32.A DEPRESSION, UNSPECIFIED DEPRESSION TYPE: ICD-10-CM

## 2024-01-10 DIAGNOSIS — R76.8 DS DNA ANTIBODY POSITIVE: ICD-10-CM

## 2024-01-10 DIAGNOSIS — M32.9 SYSTEMIC LUPUS ERYTHEMATOSUS, UNSPECIFIED SLE TYPE, UNSPECIFIED ORGAN INVOLVEMENT STATUS (HCC): ICD-10-CM

## 2024-01-10 DIAGNOSIS — R21 RASH AND NONSPECIFIC SKIN ERUPTION: ICD-10-CM

## 2024-01-10 DIAGNOSIS — M25.511 RIGHT SHOULDER PAIN, UNSPECIFIED CHRONICITY: ICD-10-CM

## 2024-01-10 DIAGNOSIS — R76.8 ANA POSITIVE: ICD-10-CM

## 2024-01-10 LAB
ALBUMIN SERPL-MCNC: 3.7 G/DL (ref 3.5–5)
ALBUMIN/GLOB SERPL: 0.9 (ref 0.4–1.6)
ALP SERPL-CCNC: 104 U/L (ref 50–136)
ALT SERPL-CCNC: 34 U/L (ref 12–65)
ANION GAP SERPL CALC-SCNC: 6 MMOL/L (ref 2–11)
AST SERPL-CCNC: 16 U/L (ref 15–37)
BILIRUB SERPL-MCNC: 0.3 MG/DL (ref 0.2–1.1)
BUN SERPL-MCNC: 17 MG/DL (ref 6–23)
CALCIUM SERPL-MCNC: 9 MG/DL (ref 8.3–10.4)
CHLORIDE SERPL-SCNC: 110 MMOL/L (ref 103–113)
CO2 SERPL-SCNC: 24 MMOL/L (ref 21–32)
CREAT SERPL-MCNC: 1.1 MG/DL (ref 0.6–1)
CREAT UR-MCNC: 164 MG/DL
GLOBULIN SER CALC-MCNC: 4.2 G/DL (ref 2.8–4.5)
GLUCOSE SERPL-MCNC: 106 MG/DL (ref 65–100)
MICROALBUMIN UR-MCNC: 2.58 MG/DL
MICROALBUMIN/CREAT UR-RTO: 16 MG/G (ref 0–30)
POTASSIUM SERPL-SCNC: 4.1 MMOL/L (ref 3.5–5.1)
PROT SERPL-MCNC: 7.9 G/DL (ref 6.3–8.2)
SODIUM SERPL-SCNC: 140 MMOL/L (ref 136–146)

## 2024-01-10 PROCEDURE — 99214 OFFICE O/P EST MOD 30 MIN: CPT | Performed by: INTERNAL MEDICINE

## 2024-01-10 RX ORDER — TRIAMCINOLONE ACETONIDE 5 MG/G
CREAM TOPICAL
Qty: 45 G | Refills: 3 | Status: SHIPPED | OUTPATIENT
Start: 2024-01-10

## 2024-01-10 RX ORDER — PREGABALIN 50 MG/1
50 CAPSULE ORAL 3 TIMES DAILY
Qty: 90 CAPSULE | Refills: 2 | Status: SHIPPED | OUTPATIENT
Start: 2024-01-10 | End: 2024-05-09

## 2024-01-10 RX ORDER — HYDROXYCHLOROQUINE SULFATE 200 MG/1
400 TABLET, FILM COATED ORAL DAILY
Qty: 180 TABLET | Refills: 1 | Status: SHIPPED | OUTPATIENT
Start: 2024-01-10

## 2024-01-10 RX ORDER — HYDROXYCHLOROQUINE SULFATE 200 MG/1
200 TABLET, FILM COATED ORAL 2 TIMES DAILY
Qty: 180 TABLET | Refills: 1 | Status: CANCELLED | OUTPATIENT
Start: 2024-01-10

## 2024-01-10 RX ORDER — FLUOXETINE HYDROCHLORIDE 40 MG/1
CAPSULE ORAL
Qty: 180 CAPSULE | Refills: 1 | Status: SHIPPED | OUTPATIENT
Start: 2024-01-10

## 2024-01-10 ASSESSMENT — PATIENT HEALTH QUESTIONNAIRE - PHQ9
9. THOUGHTS THAT YOU WOULD BE BETTER OFF DEAD, OR OF HURTING YOURSELF: 0
SUM OF ALL RESPONSES TO PHQ QUESTIONS 1-9: 0
8. MOVING OR SPEAKING SO SLOWLY THAT OTHER PEOPLE COULD HAVE NOTICED. OR THE OPPOSITE, BEING SO FIGETY OR RESTLESS THAT YOU HAVE BEEN MOVING AROUND A LOT MORE THAN USUAL: 0
SUM OF ALL RESPONSES TO PHQ QUESTIONS 1-9: 0
SUM OF ALL RESPONSES TO PHQ9 QUESTIONS 1 & 2: 0
4. FEELING TIRED OR HAVING LITTLE ENERGY: 0
6. FEELING BAD ABOUT YOURSELF - OR THAT YOU ARE A FAILURE OR HAVE LET YOURSELF OR YOUR FAMILY DOWN: 0
3. TROUBLE FALLING OR STAYING ASLEEP: 0
SUM OF ALL RESPONSES TO PHQ QUESTIONS 1-9: 0
SUM OF ALL RESPONSES TO PHQ QUESTIONS 1-9: 0
5. POOR APPETITE OR OVEREATING: 0
10. IF YOU CHECKED OFF ANY PROBLEMS, HOW DIFFICULT HAVE THESE PROBLEMS MADE IT FOR YOU TO DO YOUR WORK, TAKE CARE OF THINGS AT HOME, OR GET ALONG WITH OTHER PEOPLE: 0
1. LITTLE INTEREST OR PLEASURE IN DOING THINGS: 0
7. TROUBLE CONCENTRATING ON THINGS, SUCH AS READING THE NEWSPAPER OR WATCHING TELEVISION: 0
2. FEELING DOWN, DEPRESSED OR HOPELESS: 0

## 2024-01-10 NOTE — ACP (ADVANCE CARE PLANNING)
Advance Care Planning   The patient has the following advanced directives on file:  Advance Directives       Power of  Living Will ACP-Advance Directive ACP-Power of     Not on File Not on File Not on File Not on File            The patient has appointed the following active healthcare agents:    Primary Decision Maker: Bob Andino - Spouse - [phone]    The Patient has the following current code status:    Code Status: Not on file    Visit Documentation:  I discussed Advance Care Planning with Dario Andino today which included the importance of making their choices for care and treatment in the case of a health event that adversely affects their decision-making abilities. She has not completed the Advance Care Directives. She does not have an active health care agent at this time.  Dario Andino was encouraged to complete the declaration forms and provide a signed copy of her medical records.        Bertin Ryan  1/10/2024

## 2024-01-10 NOTE — PROGRESS NOTES
Dario Andino (: 1980) is a 43 y.o. female, here for evaluation of the following chief complaint(s):  3 Month Follow-Up (Pt is here for routine 3 month check up) and Rash (Pt states a rash on her chest that comes once a month and will last approx. 8 days. Pt states are is very red and itchy.)       ASSESSMENT/PLAN:  1. Migraine without status migrainosus, not intractable, unspecified migraine type  The following orders have not been finalized:  -     FLUoxetine (PROZAC) 40 MG capsule  2. Depression, unspecified depression type  The following orders have not been finalized:  -     FLUoxetine (PROZAC) 40 MG capsule  3. Systemic lupus erythematosus, unspecified SLE type, unspecified organ involvement status (HCC)  The following orders have not been finalized:  -     hydroxychloroquine (PLAQUENIL) 200 MG tablet  4. Ds DNA antibody positive  The following orders have not been finalized:  -     pregabalin (LYRICA) 50 MG capsule  5. ARTUR positive  The following orders have not been finalized:  -     pregabalin (LYRICA) 50 MG capsule  6. Right shoulder pain, unspecified chronicity  The following orders have not been finalized:  -     pregabalin (LYRICA) 50 MG capsule  7. Rash and nonspecific skin eruption  The following orders have not been finalized:  -     triamcinolone (ARISTOCORT) 0.5 % cream             SUBJECTIVE/OBJECTIVE:  HPI: Patient is a pleasant 43-year-old woman with a complex medi al history presenting for follow up visit. She has seen Rheumatology and with recent labs, diagnosed with mixed connective tissue disease. She was started on plaquenil through this office. Recommendation for weight based dosing of 400 mg from Rheumatology. Will order at this time.   Concern expressed for new recurrent anterior chest and cervical rash. She has declining renal function of concern as well. Offering labs at this time.   Concern regarding Lyrica from Rheumatology though several topics discussed during visit and

## 2024-02-12 DIAGNOSIS — F32.A DEPRESSION, UNSPECIFIED DEPRESSION TYPE: ICD-10-CM

## 2024-02-12 DIAGNOSIS — G43.909 MIGRAINE WITHOUT STATUS MIGRAINOSUS, NOT INTRACTABLE, UNSPECIFIED MIGRAINE TYPE: ICD-10-CM

## 2024-02-12 RX ORDER — FLUOXETINE HYDROCHLORIDE 40 MG/1
CAPSULE ORAL
Qty: 30 CAPSULE | Refills: 5 | OUTPATIENT
Start: 2024-02-12

## 2024-02-13 DIAGNOSIS — G43.709 CHRONIC MIGRAINE WITHOUT AURA WITHOUT STATUS MIGRAINOSUS, NOT INTRACTABLE: ICD-10-CM

## 2024-02-15 RX ORDER — ATOGEPANT 60 MG/1
60 TABLET ORAL DAILY
Qty: 30 TABLET | Refills: 5 | Status: SHIPPED | OUTPATIENT
Start: 2024-02-15 | End: 2024-05-15

## 2024-02-19 ENCOUNTER — OFFICE VISIT (OUTPATIENT)
Dept: INTERNAL MEDICINE CLINIC | Facility: CLINIC | Age: 44
End: 2024-02-19
Payer: COMMERCIAL

## 2024-02-19 VITALS
WEIGHT: 183.2 LBS | TEMPERATURE: 98.1 F | SYSTOLIC BLOOD PRESSURE: 131 MMHG | HEIGHT: 64 IN | DIASTOLIC BLOOD PRESSURE: 90 MMHG | BODY MASS INDEX: 31.28 KG/M2 | HEART RATE: 91 BPM | OXYGEN SATURATION: 98 %

## 2024-02-19 DIAGNOSIS — G40.A09: ICD-10-CM

## 2024-02-19 DIAGNOSIS — E66.9 OBESITY (BMI 30-39.9): Primary | ICD-10-CM

## 2024-02-19 PROCEDURE — 99213 OFFICE O/P EST LOW 20 MIN: CPT | Performed by: INTERNAL MEDICINE

## 2024-02-19 NOTE — ACP (ADVANCE CARE PLANNING)
Advance Care Planning   The patient has the following advanced directives on file:  Advance Directives       Power of  Living Will ACP-Advance Directive ACP-Power of     Not on File Not on File Not on File Not on File            The patient has appointed the following active healthcare agents:    Primary Decision Maker: Bob Andino - Spouse - [phone]    The Patient has the following current code status:    Code Status: Not on file    Visit Documentation:  I discussed Advance Care Planning with Dario Andino today which included the importance of making their choices for care and treatment in the case of a health event that adversely affects their decision-making abilities. She has not completed the Advance Care Directives. She does not have an active health care agent at this time.  Dario Andino was encouraged to complete the declaration forms and provide a signed copy of her medical records.       Bertin Ryan  2/19/2024

## 2024-02-19 NOTE — PROGRESS NOTES
Dario Andino (: 1980) is a 43 y.o. female, here for evaluation of the following chief complaint(s):  2 Week Follow-Up (Pt is here to discuss weight loss options) and Weight Management (Discuss wght loss options.)       ASSESSMENT/PLAN:  1. Obesity (BMI 30-39.9)  Assessment & Plan:    patient with class I obesity with a weight currently 183 and a BMI of 31.45.  She is interested in medical options to assist with weight loss.  Options available include Adipex, orlistat, Topamax, the new GLP-1 receptor agonist if showing significant weight loss associations and has been recently FDA approved.  Patient may qualify but cost to be prohibiting factor.  2. Childhood absence epilepsy, non-refractory (HCC)  The following orders have not been finalized:  -     lamoTRIgine (LAMICTAL) 100 MG tablet             SUBJECTIVE/OBJECTIVE:  HPI: Patient is a pleasant43-year-old woman with medical history significant for lupus erythematosus, depression, and elevated blood pressure without the diagnosis of hypertension.  She is presenting at this time for consideration evaluation of potential weight loss medication options.  Blood pressure elevated in office today to 131/90.    Weight currently 183 with a BMI of 31.45.  Weight has been increasing recently with a low of 170 in 2023.    Goal weight is 160-165. She has used Phentermine and developed unusual mood disorder. She has been on Topamax in the past, now on Lamictal.  She lost 50 lbs with use of Topamax. She stopped after 25 years of use secondary to brain fog.     Social History     Tobacco Use    Smoking status: Never    Smokeless tobacco: Never   Vaping Use    Vaping Use: Never used   Substance Use Topics    Alcohol use: Never    Drug use: Never     Vitals:    24 1536   BP: (!) 131/90   Site: Left Upper Arm   Position: Sitting   Cuff Size: Medium Adult   Pulse: 91   Temp: 98.1 °F (36.7 °C)   SpO2: 98%   Weight: 83.1 kg (183 lb 3.2 oz)   Height: 1.626 m

## 2024-02-21 ENCOUNTER — PATIENT MESSAGE (OUTPATIENT)
Dept: INTERNAL MEDICINE CLINIC | Facility: CLINIC | Age: 44
End: 2024-02-21

## 2024-02-21 ENCOUNTER — TELEPHONE (OUTPATIENT)
Dept: INTERNAL MEDICINE CLINIC | Facility: CLINIC | Age: 44
End: 2024-02-21

## 2024-02-21 DIAGNOSIS — E66.9 OBESITY (BMI 30-39.9): Primary | ICD-10-CM

## 2024-02-21 NOTE — TELEPHONE ENCOUNTER
Patient with request for Mounjaro prescription for weight loss. Sending in script. Future communication regarding weight loss medications will require office visit.

## 2024-02-26 DIAGNOSIS — R76.8 DS DNA ANTIBODY POSITIVE: ICD-10-CM

## 2024-02-26 DIAGNOSIS — R76.8 ANA POSITIVE: ICD-10-CM

## 2024-02-26 RX ORDER — PHENTERMINE HYDROCHLORIDE 37.5 MG/1
37.5 CAPSULE ORAL EVERY MORNING
Qty: 30 CAPSULE | Refills: 0 | Status: SHIPPED | OUTPATIENT
Start: 2024-02-26 | End: 2024-03-27

## 2024-02-27 RX ORDER — GABAPENTIN 100 MG/1
100 CAPSULE ORAL 3 TIMES DAILY
Qty: 270 CAPSULE | Refills: 1 | Status: SHIPPED | OUTPATIENT
Start: 2024-02-27 | End: 2024-08-25

## 2024-03-21 ENCOUNTER — TELEPHONE (OUTPATIENT)
Dept: NEUROLOGY | Age: 44
End: 2024-03-21

## 2024-03-21 DIAGNOSIS — G40.A09: ICD-10-CM

## 2024-03-21 RX ORDER — LAMOTRIGINE 100 MG/1
100 TABLET ORAL DAILY
Qty: 90 TABLET | Refills: 1 | Status: SHIPPED | OUTPATIENT
Start: 2024-03-21 | End: 2024-09-17

## 2024-03-21 NOTE — TELEPHONE ENCOUNTER
Patient called and stated that she called earlier this week and left a message that she needed a refill on Lamictal 100MG.  She has not heard anything back and she will run out of this on Saturday. She would like for it to be sent to pharmacy on file.

## 2024-05-03 ENCOUNTER — TELEPHONE (OUTPATIENT)
Dept: NEUROLOGY | Age: 44
End: 2024-05-03

## 2024-05-09 ENCOUNTER — TELEPHONE (OUTPATIENT)
Dept: NEUROLOGY | Age: 44
End: 2024-05-09

## 2024-05-09 DIAGNOSIS — G40.A09: ICD-10-CM

## 2024-05-09 NOTE — TELEPHONE ENCOUNTER
5/9  pt called & left vm that she's having seizures, wants to d/c lamitcal and start topamax again.   Msg fwd to Dr. ASHLEE Land

## 2024-05-10 ENCOUNTER — TELEPHONE (OUTPATIENT)
Dept: NEUROLOGY | Age: 44
End: 2024-05-10

## 2024-05-10 RX ORDER — LAMOTRIGINE 150 MG/1
150 TABLET ORAL 2 TIMES DAILY
Qty: 180 TABLET | Refills: 1 | Status: SHIPPED | OUTPATIENT
Start: 2024-05-10 | End: 2024-11-06

## 2024-05-10 NOTE — TELEPHONE ENCOUNTER
Brief neurology update  5/10/2024      Spoke with patient today on the phone, she explained to me the details of her recent breakthrough seizure and subsequent admission to the hospital around 4/29/2024.  Over that weekend, she was at home recovering from pain after having her kidney stone removed, resting on the couch and noticing that she was having \"typical absence seizures\" while talking to her mother.  She experienced this when she was younger, initially had absence seizures and grade 3.  She would notice brief few seconds interruptions in her train of thought, needing to ask \"what was I talking about\", losing time, feeling like the room is spinning very quickly when she comes back to awareness, continuing repeatedly until her next awakening -she open her eyes and suddenly EMT was standing around her, she was sick to her stomach and could not move her legs or feet.  She eventually had a generalized tonic-clonic seizure and was given Keppra at the hospital. She was discharged on Keppra 500 mg twice daily for 9 days and is running out of this medicine.    When patient called in the get refills on Lamotrigine, she received script for 100mg instead of 200mg. She took 100mg daily and did well until she was sick with the kidney stone and had the breakthrough seizure.    We discussed the option of being on both Keppra and lamotrigine, however it would be reasonable to maximize her monotherapy treatment, she is agreeable.    Plan:  1) for 1 week, take lamotrigine 150 mg nightly  2) on week 2, take lamotrigine 150 mg a.m. and 150 mg p.m. moving forward. (Maintenance dose of lamotrigine is 150 twice daily)        Keep her follow-up in July (7/17) and we will discuss medications.     Sarabjit Fox MD  Epilepsy & Consultation Neurology  [facility]  [address], [address]  [place] [address]  Phone: [phone]

## 2024-05-10 NOTE — TELEPHONE ENCOUNTER
Received page from [facility]  to call pt re: multiple seizures.     Pt informed me that they have already discussed with Dr. ROSADO prior to call back.

## 2024-07-12 DIAGNOSIS — M35.1 MIXED CONNECTIVE TISSUE DISEASE (HCC): ICD-10-CM

## 2024-07-12 RX ORDER — HYDROXYCHLOROQUINE SULFATE 200 MG/1
400 TABLET, FILM COATED ORAL DAILY
Qty: 180 TABLET | Refills: 1 | Status: SHIPPED | OUTPATIENT
Start: 2024-07-12

## 2024-07-12 NOTE — TELEPHONE ENCOUNTER
Sending to you as covering provider from DR. Vazquez.    George patient    LOV 02/19/2024  NOV None  Last refill 01/10/2024  Ran out 07/08/2024

## 2024-08-30 ENCOUNTER — TELEPHONE (OUTPATIENT)
Dept: NEUROLOGY | Age: 44
End: 2024-08-30

## 2024-08-30 DIAGNOSIS — G40.A09: ICD-10-CM

## 2024-08-30 DIAGNOSIS — G43.709 CHRONIC MIGRAINE WITHOUT AURA WITHOUT STATUS MIGRAINOSUS, NOT INTRACTABLE: Primary | ICD-10-CM

## 2024-08-30 NOTE — TELEPHONE ENCOUNTER
Pt came into the office today unaware that her appt had to be moved due to Dr. ROSADO out of the office. She requested refills of the following medications:     lamoTRIgine (LAMICTAL) 150 MG tablet   Atogepant (QULIPTA) 60 MG TABS   rizatriptan (MAXALT) 10 MG tablet     Please send to [facility]/PHARMACY #[id number] - [address] -  [phone] - F [phone] [[id number]]

## 2024-09-04 RX ORDER — ATOGEPANT 60 MG/1
60 TABLET ORAL DAILY
COMMUNITY
Start: 2024-07-05 | End: 2024-09-04 | Stop reason: SDUPTHER

## 2024-09-04 RX ORDER — LAMOTRIGINE 150 MG/1
150 TABLET ORAL 2 TIMES DAILY
Qty: 180 TABLET | Refills: 3 | Status: SHIPPED | OUTPATIENT
Start: 2024-09-04 | End: 2025-09-04

## 2024-09-04 RX ORDER — ATOGEPANT 60 MG/1
60 TABLET ORAL DAILY
Qty: 30 TABLET | Refills: 11 | Status: SHIPPED | OUTPATIENT
Start: 2024-09-04 | End: 2025-09-04

## 2024-09-04 RX ORDER — RIZATRIPTAN BENZOATE 10 MG/1
10 TABLET ORAL
Qty: 9 TABLET | Refills: 5 | Status: SHIPPED | OUTPATIENT
Start: 2024-09-04 | End: 2024-09-04

## 2024-09-04 NOTE — TELEPHONE ENCOUNTER
Refills sent for :    lamoTRIgine (LAMICTAL) 150 MG tablet   Atogepant (QULIPTA) 60 MG TABS   rizatriptan (MAXALT) 10 MG tablet     To:  [facility]/pharmacy #[id number] - [address] - P [phone]

## 2024-09-18 ENCOUNTER — OFFICE VISIT (OUTPATIENT)
Dept: FAMILY MEDICINE CLINIC | Facility: CLINIC | Age: 44
End: 2024-09-18
Payer: COMMERCIAL

## 2024-09-18 VITALS
DIASTOLIC BLOOD PRESSURE: 82 MMHG | TEMPERATURE: 98 F | SYSTOLIC BLOOD PRESSURE: 108 MMHG | WEIGHT: 177 LBS | HEART RATE: 82 BPM | OXYGEN SATURATION: 99 % | HEIGHT: 64 IN | BODY MASS INDEX: 30.22 KG/M2

## 2024-09-18 DIAGNOSIS — Z12.31 ENCOUNTER FOR SCREENING MAMMOGRAM FOR MALIGNANT NEOPLASM OF BREAST: ICD-10-CM

## 2024-09-18 DIAGNOSIS — Z76.89 ENCOUNTER TO ESTABLISH CARE WITH NEW DOCTOR: ICD-10-CM

## 2024-09-18 DIAGNOSIS — L73.9 FOLLICULITIS: Primary | ICD-10-CM

## 2024-09-18 PROBLEM — R22.31 NODULE OF FINGER OF RIGHT HAND: Status: RESOLVED | Noted: 2023-12-19 | Resolved: 2024-09-18

## 2024-09-18 PROBLEM — R03.0 ELEVATED BLOOD PRESSURE READING IN OFFICE WITHOUT DIAGNOSIS OF HYPERTENSION: Status: RESOLVED | Noted: 2023-03-13 | Resolved: 2024-09-18

## 2024-09-18 PROBLEM — Z79.899 ENCOUNTER FOR MEDICATION MANAGEMENT: Status: RESOLVED | Noted: 2023-10-31 | Resolved: 2024-09-18

## 2024-09-18 PROBLEM — R21 RASH AND NONSPECIFIC SKIN ERUPTION: Status: RESOLVED | Noted: 2024-01-10 | Resolved: 2024-09-18

## 2024-09-18 PROCEDURE — 99214 OFFICE O/P EST MOD 30 MIN: CPT

## 2024-09-18 RX ORDER — DOXYCYCLINE HYCLATE 100 MG
100 TABLET ORAL 2 TIMES DAILY
Qty: 14 TABLET | Refills: 0 | Status: SHIPPED | OUTPATIENT
Start: 2024-09-18 | End: 2024-09-25

## 2024-09-18 RX ORDER — MUPIROCIN 20 MG/G
OINTMENT TOPICAL
Qty: 15 G | Refills: 1 | Status: SHIPPED | OUTPATIENT
Start: 2024-09-18

## 2024-09-18 SDOH — ECONOMIC STABILITY: INCOME INSECURITY: HOW HARD IS IT FOR YOU TO PAY FOR THE VERY BASICS LIKE FOOD, HOUSING, MEDICAL CARE, AND HEATING?: NOT HARD AT ALL

## 2024-09-18 SDOH — ECONOMIC STABILITY: FOOD INSECURITY: WITHIN THE PAST 12 MONTHS, THE FOOD YOU BOUGHT JUST DIDN'T LAST AND YOU DIDN'T HAVE MONEY TO GET MORE.: NEVER TRUE

## 2024-09-18 SDOH — HEALTH STABILITY: PHYSICAL HEALTH: ON AVERAGE, HOW MANY MINUTES DO YOU ENGAGE IN EXERCISE AT THIS LEVEL?: 30 MIN

## 2024-09-18 SDOH — ECONOMIC STABILITY: FOOD INSECURITY: WITHIN THE PAST 12 MONTHS, YOU WORRIED THAT YOUR FOOD WOULD RUN OUT BEFORE YOU GOT MONEY TO BUY MORE.: NEVER TRUE

## 2024-09-18 SDOH — HEALTH STABILITY: PHYSICAL HEALTH: ON AVERAGE, HOW MANY DAYS PER WEEK DO YOU ENGAGE IN MODERATE TO STRENUOUS EXERCISE (LIKE A BRISK WALK)?: 3 DAYS

## 2024-09-18 ASSESSMENT — ENCOUNTER SYMPTOMS
ABDOMINAL PAIN: 0
BLOOD IN STOOL: 0
VOMITING: 0
SHORTNESS OF BREATH: 0
COUGH: 0
CHEST TIGHTNESS: 0
DIARRHEA: 0
CONSTIPATION: 0
NAUSEA: 0

## 2024-12-13 ENCOUNTER — OFFICE VISIT (OUTPATIENT)
Dept: NEUROLOGY | Age: 44
End: 2024-12-13
Payer: COMMERCIAL

## 2024-12-13 VITALS
HEART RATE: 81 BPM | SYSTOLIC BLOOD PRESSURE: 135 MMHG | WEIGHT: 181.4 LBS | DIASTOLIC BLOOD PRESSURE: 90 MMHG | BODY MASS INDEX: 30.97 KG/M2 | HEIGHT: 64 IN | OXYGEN SATURATION: 94 %

## 2024-12-13 DIAGNOSIS — G43.709 CHRONIC MIGRAINE WITHOUT AURA WITHOUT STATUS MIGRAINOSUS, NOT INTRACTABLE: Primary | ICD-10-CM

## 2024-12-13 DIAGNOSIS — G40.A09: ICD-10-CM

## 2024-12-13 PROCEDURE — 99215 OFFICE O/P EST HI 40 MIN: CPT | Performed by: PSYCHIATRY & NEUROLOGY

## 2024-12-13 RX ORDER — ATOGEPANT 60 MG/1
60 TABLET ORAL DAILY
Qty: 30 TABLET | Refills: 11 | Status: SHIPPED | OUTPATIENT
Start: 2024-12-13 | End: 2025-12-13

## 2024-12-13 RX ORDER — LAMOTRIGINE 150 MG/1
150 TABLET ORAL 2 TIMES DAILY
Qty: 180 TABLET | Refills: 3 | Status: SHIPPED | OUTPATIENT
Start: 2024-12-13 | End: 2025-12-13

## 2024-12-13 RX ORDER — RIZATRIPTAN BENZOATE 10 MG/1
10 TABLET ORAL
Qty: 9 TABLET | Refills: 5 | Status: SHIPPED | OUTPATIENT
Start: 2024-12-13 | End: 2024-12-13

## 2024-12-13 NOTE — PROGRESS NOTES
severity.  She has migraine triggers from irritating chemical smells such as bleach.  When she tries to use rizatriptan, headache severity decreases from 10 out of 10 to 8 out of 10 and does not improve with the second dose.  She has tried to max out the dose to 20 mg per 24 hours without relief.  Clinically, this is considered failure to treatment with rizatriptan.  Topamax was not improved migraine frequency.  Continues to have migraines despite being on Lyrica as well.  Saw benefit with Nurtec use.     Her epilepsy has been better controlled over the last few years while on topiramate and recently she started Lyrica 50 mg 3 times daily for painful symptoms of lupus flare.  Reported pain in her right shoulder and arm region.  She can tell when she has a seizure and even absence spells are very recognizable to her, feels as if her brain \"reset\" and usually has some postictal confusion.  This has not happened to her for years.  Has not had any bilateral tonic-clonic seizures also for many years.     Event Type 1: FIAS / staring spells  Age of onset: early childhood  Aura: none  Ictus: nonresponsive and staring forward, no abnormal movements  Duration: 5-20 seconds  Post-ictus: post-ictal mild confusion for <1-2 mins  Frequency: well controlled  Last event: does not recall  Triggers: possibly sleep deprivation     Event Type 2: BTC  Age of onset: teenage years  Aura: unknown  Ictus: Losing awareness and generalized tonic-clonic shaking  Duration: 1 to 3 minutes  Post-ictus: Considerable postevent confusion more than 1 hour  Frequency: Rare, recently occurred in setting of Giardia infection and diarrhea about 3 years ago  Last event: 2020?  Triggers: Electrolyte imbalance/infection     Risk Factors:  Developmental history: normal  Febrile seizures: no  FMHx of seizures: none  TBI w/ LOC: no  CNS infections: no  Alcohol use: no   Hx of drug use: no  Hx of Status Epilepticus: no     Prior seizure work-up:  EEG: n/a,

## 2024-12-23 ENCOUNTER — PROCEDURE VISIT (OUTPATIENT)
Dept: NEUROLOGY | Age: 44
End: 2024-12-23
Payer: COMMERCIAL

## 2024-12-23 DIAGNOSIS — G43.709 CHRONIC MIGRAINE WITHOUT AURA WITHOUT STATUS MIGRAINOSUS, NOT INTRACTABLE: Primary | ICD-10-CM

## 2024-12-23 PROCEDURE — 95819 EEG AWAKE AND ASLEEP: CPT | Performed by: PSYCHIATRY & NEUROLOGY

## 2024-12-23 NOTE — PROGRESS NOTES
ELECTROENCEPHALOGRAM FOR [facility] NEUROLOGY     EEG: Routine  Pt Class: Outpatient    DATE(s) OF EE24  DATE OF REPORT: 24    MRN: [id number]  YOB: 1980  EEG No.   ICD-10: R56.9 - Unspecified convulsions  CPT Code: 69811 (20-40 minutes, awake and drowsy)  CSN: [id number]    HISTORY: Chronic migraine without aura without status migrainosus, not intractable     MEDICATIONS THAT COULD AFFECT EEG: Lamictal    TECHNICAL SUMMARY  This is a digital EEG recorded with all input channels reviewed with bipolar and referential montages using the modified combinatorial system nomenclature.    DESCRIPTION OF RECORD AND EVENTS  During the maximally alert state a 10 Hz posterior dominant rhythm was seen that was symmetric, reactive to eye opening and well regulated. More anteriorly, low voltage frontocentral beta predominated. Drowsiness was characterized by alpha attenuation and increased symmetric frontocentral theta activity. Vertex sharp transients were seen. Stage 2 sleep was reached characterized by symmetric sleep spindles and K-complexes.    HV:  Hyperventilation was performed for 3 minutes with good effort. No pathologic change was seen with HV.    PHOTIC STIMULATION: Photic stimulation was done from 1-21 Hz; photic driving was seen; photoparoxysmal responses were absent.    ELECTROCARDIOGRAM: Normal Sinus Rhythm    IMPRESSION: Normal EEG in Awake and Asleep states.    CLINICAL CORRELATION: This EEG is normal for a patient of this age during wakefulness and sleep. There are no asymmetries, epileptiform discharges or electrographic seizures.     An EEG without epileptiform discharges does not exclude the possibility of epilepsy. If the clinical suspicion of epilepsy remains, consider additional EEG recordings.      Sarabjit Fox MD  [facility] Neurology  [address], [place] [address]  Phone: [phone]  Fax: [phone]

## 2025-01-05 DIAGNOSIS — M35.1 MIXED CONNECTIVE TISSUE DISEASE: ICD-10-CM

## 2025-01-06 RX ORDER — HYDROXYCHLOROQUINE SULFATE 200 MG/1
400 TABLET, FILM COATED ORAL DAILY
Qty: 180 TABLET | Refills: 1 | OUTPATIENT
Start: 2025-01-06

## 2025-01-16 ENCOUNTER — OFFICE VISIT (OUTPATIENT)
Dept: NEUROLOGY | Age: 45
End: 2025-01-16
Payer: COMMERCIAL

## 2025-01-16 DIAGNOSIS — R41.3 MEMORY DIFFICULTY: Primary | ICD-10-CM

## 2025-01-16 PROCEDURE — 96138 PSYCL/NRPSYC TECH 1ST: CPT | Performed by: PSYCHIATRY & NEUROLOGY

## 2025-01-16 PROCEDURE — 96132 NRPSYC TST EVAL PHYS/QHP 1ST: CPT | Performed by: PSYCHIATRY & NEUROLOGY

## 2025-01-16 NOTE — PROGRESS NOTES
Cognitive Testing Evaluation  Introduction:  Dario Andino  1980  Female  This 44 year old Female was administered a battery of neurocognitive testing on 01/16/2025.  Reason for Testing::  Cognitive or memory concern (no recent injury) reported by the physician  Tests Administered:  Trails A, Trails B, Digit Symbol Substitution, Stroop, Immediate Recognition, Delayed Recognition  The combined test administration time was 11 minutes  Test Results:  Cognitive testing was provided via a battery of cognitive assessments. The pattern of test scores indicate that results are valid. A Clinical Report with further description of scores and results is also available.  Overall: Patient tested in the 7th percentile (standard score of 78).  Trails A: Patient tested in the 11th percentile (scaled standard score of 81).  Trails B: Patient tested in the 1st percentile (scaled standard score of 56).  Digit Symbol Substitution: Patient tested in the 1st percentile (scaled standard score of 57).  Stroop: Patient tested in the 2nd percentile (scaled standard score of 69).  Immediate Recognition: Patient tested in the 54th percentile (scaled standard score of 101).  Delayed Recognition: Patient tested in the 33rd percentile (scaled standard score of 93).  Interpretation of Test Scores:  Examination of individual component tests shows:  Attention - Trails A: possible impairment  Mental Flexibility - Trails B: likely impairment  Executive Function - Digit Symbol Substitution: likely impairment  Executive Function - Stroop: likely impairment  Memory - Immediate Recognition: unlikely impairment  Memory - Delayed Recognition: unlikely impairment  The patient’s overall cognitive test performance was in the 7th percentile when compared to individuals of a similar age and gender, suggesting likely presence of cognitive impairment.    Procedure (Beatsy) Time: 31 minutes was spent reviewing, interpreting the cognitive testing results

## 2025-01-17 PROBLEM — R41.3 MEMORY DIFFICULTY: Status: ACTIVE | Noted: 2025-01-17

## 2025-02-05 ENCOUNTER — TELEPHONE (OUTPATIENT)
Dept: NEUROLOGY | Age: 45
End: 2025-02-05

## 2025-02-05 NOTE — TELEPHONE ENCOUNTER
Pt reports increased confusion since her last visit in December. Pt reports losing her ability to find words these episodes happen multiple times a day. Pt would like to go over brain check results.

## 2025-02-06 ENCOUNTER — TELEPHONE (OUTPATIENT)
Dept: NEUROLOGY | Age: 45
End: 2025-02-06

## 2025-02-21 ENCOUNTER — TELEPHONE (OUTPATIENT)
Dept: NEUROLOGY | Age: 45
End: 2025-02-21

## 2025-03-31 DIAGNOSIS — M35.1 MIXED CONNECTIVE TISSUE DISEASE: ICD-10-CM

## 2025-04-01 RX ORDER — HYDROXYCHLOROQUINE SULFATE 200 MG/1
400 TABLET, FILM COATED ORAL DAILY
Qty: 180 TABLET | Refills: 1 | OUTPATIENT
Start: 2025-04-01

## 2025-04-03 ENCOUNTER — TELEPHONE (OUTPATIENT)
Dept: NEUROLOGY | Age: 45
End: 2025-04-03

## 2025-04-03 NOTE — TELEPHONE ENCOUNTER
Left message to schedule a sooner virtual appointment per message from Dr. Fox, have held April 10th.

## 2025-04-04 ENCOUNTER — TELEPHONE (OUTPATIENT)
Dept: NEUROLOGY | Age: 45
End: 2025-04-04

## 2025-04-04 NOTE — TELEPHONE ENCOUNTER
Informed pt you are aware of her new symptoms of the warm sensation and are trying to schedule pt sooner. Pt reports [employer] is easier to communicate on since she is a .

## 2025-04-15 ENCOUNTER — TELEMEDICINE (OUTPATIENT)
Dept: NEUROLOGY | Age: 45
End: 2025-04-15
Payer: COMMERCIAL

## 2025-04-15 DIAGNOSIS — G40.A09: ICD-10-CM

## 2025-04-15 DIAGNOSIS — G43.709 CHRONIC MIGRAINE WITHOUT AURA WITHOUT STATUS MIGRAINOSUS, NOT INTRACTABLE: Primary | ICD-10-CM

## 2025-04-15 PROCEDURE — 99215 OFFICE O/P EST HI 40 MIN: CPT | Performed by: PSYCHIATRY & NEUROLOGY

## 2025-04-15 RX ORDER — RIZATRIPTAN BENZOATE 10 MG/1
10 TABLET ORAL
Qty: 9 TABLET | Refills: 5 | Status: SHIPPED | OUTPATIENT
Start: 2025-04-15

## 2025-04-15 RX ORDER — LEVETIRACETAM 500 MG/1
1000 TABLET, FILM COATED, EXTENDED RELEASE ORAL DAILY
Qty: 60 TABLET | Refills: 11 | Status: SHIPPED | OUTPATIENT
Start: 2025-04-15 | End: 2026-04-15

## 2025-04-15 RX ORDER — LAMOTRIGINE 150 MG/1
150 TABLET ORAL 2 TIMES DAILY
Qty: 180 TABLET | Refills: 3 | Status: SHIPPED | OUTPATIENT
Start: 2025-04-15 | End: 2026-04-15

## 2025-04-15 RX ORDER — ATOGEPANT 60 MG/1
60 TABLET ORAL DAILY
Qty: 30 TABLET | Refills: 11 | Status: SHIPPED | OUTPATIENT
Start: 2025-04-15 | End: 2026-04-15

## 2025-04-15 NOTE — PROGRESS NOTES
in the produced words. Services were provided through a video synchronous discussion virtually to substitute for in-person clinic visit.

## 2025-06-18 ENCOUNTER — OFFICE VISIT (OUTPATIENT)
Dept: NEUROLOGY | Age: 45
End: 2025-06-18

## 2025-06-18 VITALS
HEIGHT: 64 IN | DIASTOLIC BLOOD PRESSURE: 78 MMHG | WEIGHT: 180 LBS | HEART RATE: 96 BPM | SYSTOLIC BLOOD PRESSURE: 116 MMHG | BODY MASS INDEX: 30.73 KG/M2

## 2025-06-18 DIAGNOSIS — G40.A09: ICD-10-CM

## 2025-06-18 DIAGNOSIS — G43.709 CHRONIC MIGRAINE WITHOUT AURA WITHOUT STATUS MIGRAINOSUS, NOT INTRACTABLE: ICD-10-CM

## 2025-06-18 DIAGNOSIS — G25.3 MYOCLONUS: Primary | ICD-10-CM

## 2025-06-18 PROCEDURE — 99212 OFFICE O/P EST SF 10 MIN: CPT | Performed by: PSYCHIATRY & NEUROLOGY

## 2025-06-18 RX ORDER — PREGABALIN 50 MG/1
50 CAPSULE ORAL EVERY EVENING
Qty: 90 CAPSULE | Refills: 3 | Status: SHIPPED | OUTPATIENT
Start: 2025-06-18 | End: 2026-06-18

## 2025-06-18 NOTE — PROGRESS NOTES
Artificial Intelligence will be utilized during this visit to record, process the conversation to generate a clinical note, and support improvement of the AI technology. The patient (or guardian, if applicable) and other individuals in attendance at the appointment consented to the use of AI, including the recording. I have revised all aspects of this note, parts of which were produced using speech recognition software, which may contain inadvertent errors.

## 2025-07-02 DIAGNOSIS — M35.1 MIXED CONNECTIVE TISSUE DISEASE: ICD-10-CM

## 2025-07-02 RX ORDER — HYDROXYCHLOROQUINE SULFATE 200 MG/1
400 TABLET, FILM COATED ORAL DAILY
Qty: 180 TABLET | Refills: 1 | OUTPATIENT
Start: 2025-07-02

## 2025-07-29 ENCOUNTER — TELEPHONE (OUTPATIENT)
Dept: NEUROLOGY | Age: 45
End: 2025-07-29